# Patient Record
Sex: FEMALE | HISPANIC OR LATINO | Employment: FULL TIME | URBAN - METROPOLITAN AREA
[De-identification: names, ages, dates, MRNs, and addresses within clinical notes are randomized per-mention and may not be internally consistent; named-entity substitution may affect disease eponyms.]

---

## 2019-03-05 ENCOUNTER — APPOINTMENT (OUTPATIENT)
Dept: GENERAL RADIOLOGY | Facility: CLINIC | Age: 24
End: 2019-03-05
Attending: EMERGENCY MEDICINE
Payer: MEDICAID

## 2019-03-05 ENCOUNTER — HOSPITAL ENCOUNTER (EMERGENCY)
Facility: CLINIC | Age: 24
Discharge: HOME | End: 2019-03-05
Attending: EMERGENCY MEDICINE | Admitting: EMERGENCY MEDICINE
Payer: MEDICAID

## 2019-03-05 VITALS
OXYGEN SATURATION: 99 % | WEIGHT: 289 LBS | RESPIRATION RATE: 22 BRPM | SYSTOLIC BLOOD PRESSURE: 105 MMHG | DIASTOLIC BLOOD PRESSURE: 71 MMHG | HEART RATE: 79 BPM | TEMPERATURE: 98.1 F

## 2019-03-05 DIAGNOSIS — R07.9 CHEST PAIN, UNSPECIFIED TYPE: ICD-10-CM

## 2019-03-05 LAB
ANION GAP SERPL CALCULATED.3IONS-SCNC: 7 MMOL/L (ref 3–14)
BASOPHILS # BLD AUTO: 0 10E9/L (ref 0–0.2)
BASOPHILS NFR BLD AUTO: 0.3 %
BUN SERPL-MCNC: 12 MG/DL (ref 7–30)
CALCIUM SERPL-MCNC: 8.1 MG/DL (ref 8.5–10.1)
CHLORIDE SERPL-SCNC: 107 MMOL/L (ref 94–109)
CO2 SERPL-SCNC: 26 MMOL/L (ref 20–32)
CREAT SERPL-MCNC: 0.51 MG/DL (ref 0.52–1.04)
D DIMER PPP FEU-MCNC: 0.4 UG/ML FEU (ref 0–0.5)
DIFFERENTIAL METHOD BLD: ABNORMAL
EOSINOPHIL # BLD AUTO: 0.1 10E9/L (ref 0–0.7)
EOSINOPHIL NFR BLD AUTO: 0.9 %
ERYTHROCYTE [DISTWIDTH] IN BLOOD BY AUTOMATED COUNT: 15.3 % (ref 10–15)
GFR SERPL CREATININE-BSD FRML MDRD: >90 ML/MIN/{1.73_M2}
GLUCOSE SERPL-MCNC: 84 MG/DL (ref 70–99)
HCT VFR BLD AUTO: 37.3 % (ref 35–47)
HGB BLD-MCNC: 12.1 G/DL (ref 11.7–15.7)
IMM GRANULOCYTES # BLD: 0 10E9/L (ref 0–0.4)
IMM GRANULOCYTES NFR BLD: 0.3 %
INTERPRETATION ECG - MUSE: NORMAL
LYMPHOCYTES # BLD AUTO: 2.2 10E9/L (ref 0.8–5.3)
LYMPHOCYTES NFR BLD AUTO: 29.8 %
MCH RBC QN AUTO: 23.8 PG (ref 26.5–33)
MCHC RBC AUTO-ENTMCNC: 32.4 G/DL (ref 31.5–36.5)
MCV RBC AUTO: 73 FL (ref 78–100)
MONOCYTES # BLD AUTO: 0.4 10E9/L (ref 0–1.3)
MONOCYTES NFR BLD AUTO: 4.9 %
NEUTROPHILS # BLD AUTO: 4.8 10E9/L (ref 1.6–8.3)
NEUTROPHILS NFR BLD AUTO: 63.8 %
NRBC # BLD AUTO: 0 10*3/UL
NRBC BLD AUTO-RTO: 0 /100
PLATELET # BLD AUTO: 341 10E9/L (ref 150–450)
POTASSIUM SERPL-SCNC: 3.7 MMOL/L (ref 3.4–5.3)
RBC # BLD AUTO: 5.09 10E12/L (ref 3.8–5.2)
SODIUM SERPL-SCNC: 140 MMOL/L (ref 133–144)
TROPONIN I SERPL-MCNC: <0.015 UG/L (ref 0–0.04)
WBC # BLD AUTO: 7.5 10E9/L (ref 4–11)

## 2019-03-05 PROCEDURE — 85025 COMPLETE CBC W/AUTO DIFF WBC: CPT | Performed by: EMERGENCY MEDICINE

## 2019-03-05 PROCEDURE — 71046 X-RAY EXAM CHEST 2 VIEWS: CPT

## 2019-03-05 PROCEDURE — 84484 ASSAY OF TROPONIN QUANT: CPT | Performed by: EMERGENCY MEDICINE

## 2019-03-05 PROCEDURE — 80048 BASIC METABOLIC PNL TOTAL CA: CPT | Performed by: EMERGENCY MEDICINE

## 2019-03-05 PROCEDURE — 85379 FIBRIN DEGRADATION QUANT: CPT | Performed by: EMERGENCY MEDICINE

## 2019-03-05 PROCEDURE — 93005 ELECTROCARDIOGRAM TRACING: CPT

## 2019-03-05 PROCEDURE — 99285 EMERGENCY DEPT VISIT HI MDM: CPT | Mod: 25

## 2019-03-05 SDOH — HEALTH STABILITY: MENTAL HEALTH: HOW OFTEN DO YOU HAVE A DRINK CONTAINING ALCOHOL?: NEVER

## 2019-03-05 ASSESSMENT — ENCOUNTER SYMPTOMS
FEVER: 0
VOMITING: 0
NAUSEA: 0
BACK PAIN: 1
ABDOMINAL PAIN: 0
COUGH: 0

## 2019-03-05 NOTE — ED PROVIDER NOTES
"  History     Chief Complaint:  Chest pain     HPI   Milla Tinoco is a 24 year old female who presents with chest pain. The patient noticed chest pain 1 month ago in the anterior R sternal region that is sharp and intermittent. The episodes last about 10 minutes and nothing provokes or palliates the pain. Due to concern, the patient has visited the ED for evaluation and treatment. Upon arrival, the patient reports back pain in the middle and upper back that started 5 days ago. She describes the pain as \"burning.\" The patient denies any cough, fever, nausea, vomiting, or abdominal pain. The patient also denies any recent long travel, immobilization, use of birth control pills, or family history of heart issues.    Allergies:  The patient has no known drug allergies.    Medications:    The patient is currently on no known regular medications.     Past Medical History:    The patient has no known significant past medical history.    Past Surgical History:    The patient has no known pertinent past surgical history.    Family History:    No known past pertinent family history.    Social History:  Marital Status:  Unknown   Smoker:   Never   Alcohol:   No   Drugs:   No     Review of Systems   Constitutional: Negative for fever.   Respiratory: Negative for cough.    Cardiovascular: Positive for chest pain.   Gastrointestinal: Negative for abdominal pain, nausea and vomiting.   Musculoskeletal: Positive for back pain.   All other systems reviewed and are negative.    Physical Exam     Patient Vitals for the past 24 hrs:   BP Temp Temp src Pulse Heart Rate Resp SpO2 Weight   03/05/19 1353 -- -- -- -- 80 22 -- --   03/05/19 1200 105/71 -- -- -- -- -- -- --   03/05/19 1145 151/41 98.1  F (36.7  C) Oral 79 79 16 99 % 131.1 kg (289 lb)     Physical Exam  General/Appearance: appears stated age, well-groomed, appears comfortable, elevated BMI  Eyes: EOMI, no scleral injection, no icterus  ENT: MMM  Neck: supple, nl ROM, no " stiffness  Cardiovascular: RRR, nl S1S2, no m/r/g, 2+ pulses in all 4 extremities, cap refill <2sec  Respiratory: CTAB, good air movement throughout, no wheezes/rhonchi/rales, no increased WOB, no retractions  Back: no lesions  GI: abd soft, non-distended, nttp,  no HSM, no rebound, no guarding, nl BS  MSK: ALBERT, good tone, no bony abnormality  Skin: warm and well-perfused, no rash, no edema, no ecchymosis, nl turgor  Neuro: GCS 15, alert and oriented, no gross focal neuro deficits  Psych: interacts appropriately  Heme: no petechia, no purpura, no active bleeding    Emergency Department Course   ECG:  Indication: chest pain   Time: 1148  Vent. Rate 84 bpm. WI interval 158. QRS duration 80. QT/QTc 372/439. P-R-T axis 8 73 57. Normal sinus rhythm. Normal ECG. Read time: 1151    Imaging:  Radiographic findings were communicated with the patient who voiced understanding of the findings.    XR Chest 2 views:   Heart size is normal. No pleural effusion, pneumothorax,  or abnormal area of consolidation. as per radiology.     Laboratory:  CBC: WBC: 7.5, HGB: 12.1, PLT: 341  1212 Troponin: <0.015  D-Dimer: 0.4  BMP: Creatinine 0.51, calcium 8.1, o/w WNL    Emergency Department Course:  (1159) I performed an exam of the patient as documented above.     (5699) I rechecked the patient and discussed the results of their workup thus far.      Findings and plan explained. Patient discharged home with instructions regarding supportive care, medications, and reasons to return. The importance of close follow-up was reviewed.     I personally reviewed the laboratory results with them and answered all related questions prior to discharge.    Impression & Plan    Medical Decision Making:  This pt presents with chest pain of unclear etiology.  At this time I do not suspect that there is an acute/dangerous pathology for the chest pain.  They have no significant personal/family history of cardiac ds.  They have no significant cardiac risk  factors.  Their EKG and CXR were unremarkable.  Their trop was neg and I feel their risk is low-enough to not warrant delta trops.  I have also considered PE but they are PERC neg/have a neg DDimer. There are no focal infiltrates, effusions, pulm edema, or evidence of PTX on CXR. The pt has not had recent fevers or viral infections to suggest pericarditis.  They are at low risk for aortic pathology and have nl aortic contour on CXR.  They have not had recent chest trauma.  All of this was discussed with the pt and they were reassurred.  I have advised them to follow-up with their PCP in the next 3 days to get further evaluation, and to return to the ED sooner if their CP continues/worsens, they develop severe SOB/fevers/lightheadedness, or they develop any other new and concerning sxs. At this point the pt is HD stable and appropriate for discharge.    Diagnosis:    ICD-10-CM    1. Chest pain, unspecified type R07.9      Disposition:  discharged to home    Scribe Disclosure:  I, Noe Fuentes, am serving as a scribe on 3/5/2019 at 11:59 AM to personally document services performed by Jocelyn Kathleen* based on my observations and the provider's statements to me.     Noe Fuentes  3/5/2019    EMERGENCY DEPARTMENT       Jocelyn Kathleen MD  03/05/19 [id number]

## 2019-03-05 NOTE — ED AVS SNAPSHOT
Emergency Department  [address] [facility] [address]  Phone:  [phone]  Fax:  [phone]                                    Milla Tinoco   MRN: [id number]    Department:   Emergency Department   Date of Visit:  3/5/2019           After Visit Summary Signature Page    I have received my discharge instructions, and my questions have been answered. I have discussed any challenges I see with this plan with the nurse or doctor.    ..........................................................................................................................................  Patient/Patient Representative Signature      ..........................................................................................................................................  Patient Representative Print Name and Relationship to Patient    ..................................................               ................................................  Date                                   Time    ..........................................................................................................................................  Reviewed by Signature/Title    ...................................................              ..............................................  Date                                               Time          22EPIC Rev 08/18

## 2019-03-05 NOTE — LETTER
March 5, 2019      To Whom It May Concern:      Milla Tinoco was seen in our Emergency Department today, 03/05/19.  I expect her condition to improve over the next 1-2 days.  She may return to work/school.    Sincerely,

## 2019-05-13 ENCOUNTER — OFFICE VISIT (OUTPATIENT)
Dept: FAMILY MEDICINE | Facility: CLINIC | Age: 24
End: 2019-05-13
Payer: MEDICAID

## 2019-05-13 VITALS
BODY MASS INDEX: 55.32 KG/M2 | HEART RATE: 81 BPM | TEMPERATURE: 98.5 F | SYSTOLIC BLOOD PRESSURE: 127 MMHG | WEIGHT: 293 LBS | HEIGHT: 61 IN | OXYGEN SATURATION: 97 % | DIASTOLIC BLOOD PRESSURE: 79 MMHG

## 2019-05-13 DIAGNOSIS — N30.01 ACUTE CYSTITIS WITH HEMATURIA: Primary | ICD-10-CM

## 2019-05-13 DIAGNOSIS — R30.0 DYSURIA: ICD-10-CM

## 2019-05-13 PROBLEM — E66.01 MORBID OBESITY (H): Status: ACTIVE | Noted: 2019-05-13

## 2019-05-13 LAB
ALBUMIN UR-MCNC: NEGATIVE MG/DL
APPEARANCE UR: CLEAR
BACTERIA #/AREA URNS HPF: ABNORMAL /HPF
BILIRUB UR QL STRIP: NEGATIVE
COLOR UR AUTO: YELLOW
GLUCOSE UR STRIP-MCNC: NEGATIVE MG/DL
HGB UR QL STRIP: ABNORMAL
KETONES UR STRIP-MCNC: NEGATIVE MG/DL
LEUKOCYTE ESTERASE UR QL STRIP: NEGATIVE
NITRATE UR QL: NEGATIVE
NON-SQ EPI CELLS #/AREA URNS LPF: ABNORMAL /LPF
PH UR STRIP: 6 PH (ref 5–7)
RBC #/AREA URNS AUTO: ABNORMAL /HPF
SOURCE: ABNORMAL
SP GR UR STRIP: 1.02 (ref 1–1.03)
UROBILINOGEN UR STRIP-ACNC: 0.2 EU/DL (ref 0.2–1)
WBC #/AREA URNS AUTO: ABNORMAL /HPF

## 2019-05-13 PROCEDURE — 99203 OFFICE O/P NEW LOW 30 MIN: CPT | Performed by: PHYSICIAN ASSISTANT

## 2019-05-13 PROCEDURE — 81001 URINALYSIS AUTO W/SCOPE: CPT | Performed by: PHYSICIAN ASSISTANT

## 2019-05-13 RX ORDER — SULFAMETHOXAZOLE/TRIMETHOPRIM 800-160 MG
1 TABLET ORAL 2 TIMES DAILY
Qty: 14 TABLET | Refills: 0 | Status: SHIPPED | OUTPATIENT
Start: 2019-05-13 | End: 2019-11-06

## 2019-05-13 ASSESSMENT — MIFFLIN-ST. JEOR: SCORE: 2014.58

## 2019-05-13 NOTE — PROGRESS NOTES
"  SUBJECTIVE:   Milla Tinoco is a 24 year old female who presents to clinic today for the following   health issues:      URINARY TRACT SYMPTOMS      Duration: x3 days    Description  dysuria, frequency, urgency and back pain    Intensity:  mild    Accompanying signs and symptoms:  Fever/chills: no   Flank pain no   Nausea and vomiting: YES- vomited once   Vaginal symptoms: none  Abdominal/Pelvic Pain: YES    History  History of frequent UTI's: no   History of kidney stones: no   Sexually Active: YES  Possibility of pregnancy: No    Precipitating or alleviating factors: None    Therapies tried and outcome: Tylenol   Outcome: didn't help with sx           Additional history: as documented    Reviewed  and updated as needed this visit by clinical staff         Reviewed and updated as needed this visit by Provider         BP Readings from Last 3 Encounters:   05/13/19 127/79   03/05/19 105/71    Wt Readings from Last 3 Encounters:   05/13/19 133.7 kg (294 lb 12.8 oz)   03/05/19 131.1 kg (289 lb)                    ROS:  Constitutional, HEENT, cardiovascular, pulmonary, gi and gu systems are negative, except as otherwise noted.    OBJECTIVE:     /79   Pulse 81   Temp 98.5  F (36.9  C) (Oral)   Ht 1.549 m (5' 1\")   Wt 133.7 kg (294 lb 12.8 oz)   LMP 05/05/2019 (Approximate)   SpO2 97%   BMI 55.70 kg/m    Body mass index is 55.7 kg/m .  GENERAL: alert and no distress  EYES: Eyes grossly normal to inspection  PSYCH: mentation appears normal, affect normal/bright    Diagnostic Test Results:  Results for orders placed or performed in visit on 05/13/19 (from the past 24 hour(s))   UA with Microscopic reflex to Culture   Result Value Ref Range    Color Urine Yellow     Appearance Urine Clear     Glucose Urine Negative NEG^Negative mg/dL    Bilirubin Urine Negative NEG^Negative    Ketones Urine Negative NEG^Negative mg/dL    Specific Gravity Urine 1.025 1.003 - 1.035    pH Urine 6.0 5.0 - 7.0 pH    Protein Albumin " Urine Negative NEG^Negative mg/dL    Urobilinogen Urine 0.2 0.2 - 1.0 EU/dL    Nitrite Urine Negative NEG^Negative    Blood Urine Small (A) NEG^Negative    Leukocyte Esterase Urine Negative NEG^Negative    Source Midstream Urine     WBC Urine 0 - 5 OTO5^0 - 5 /HPF    RBC Urine O - 2 OTO2^O - 2 /HPF    Squamous Epithelial /LPF Urine Moderate (A) FEW^Few /LPF    Bacteria Urine Moderate (A) NEG^Negative /HPF       ASSESSMENT/PLAN:             1. Acute cystitis with hematuria  OTC probiotics while on antibiotic to help limit some potential side effects.   - sulfamethoxazole-trimethoprim (BACTRIM DS/SEPTRA DS) 800-160 MG tablet; Take 1 tablet by mouth 2 times daily  Dispense: 14 tablet; Refill: 0    2. Dysuria    - UA with Microscopic reflex to Culture    Follow up if symptoms persist or worsen     Aleksandar Dumas PA-C  [facility]

## 2019-05-13 NOTE — NURSING NOTE
"Chief Complaint   Patient presents with     Urinary Problem     /79   Pulse 81   Temp 98.5  F (36.9  C) (Oral)   Ht 1.549 m (5' 1\")   Wt 133.7 kg (294 lb 12.8 oz)   LMP 05/05/2019 (Approximate)   SpO2 97%   BMI 55.70 kg/m   Estimated body mass index is 55.7 kg/m  as calculated from the following:    Height as of this encounter: 1.549 m (5' 1\").    Weight as of this encounter: 133.7 kg (294 lb 12.8 oz).  Medication Reconciliation: complete      Health Maintenance that is potentially due pending provider review:  Pap Smear    Pt will schedule physical appt and have pap smear done at that time.    JOHANNY Jorge  "

## 2019-11-06 ENCOUNTER — HOSPITAL ENCOUNTER (EMERGENCY)
Facility: CLINIC | Age: 24
Discharge: HOME | End: 2019-11-06
Attending: PHYSICIAN ASSISTANT | Admitting: PHYSICIAN ASSISTANT
Payer: COMMERCIAL

## 2019-11-06 VITALS
TEMPERATURE: 98.1 F | BODY MASS INDEX: 55.32 KG/M2 | OXYGEN SATURATION: 100 % | DIASTOLIC BLOOD PRESSURE: 68 MMHG | HEART RATE: 91 BPM | WEIGHT: 293 LBS | RESPIRATION RATE: 18 BRPM | SYSTOLIC BLOOD PRESSURE: 128 MMHG | HEIGHT: 61 IN

## 2019-11-06 DIAGNOSIS — L03.011 PARONYCHIA OF FINGER OF RIGHT HAND: ICD-10-CM

## 2019-11-06 PROCEDURE — 99283 EMERGENCY DEPT VISIT LOW MDM: CPT | Mod: 25

## 2019-11-06 PROCEDURE — 11765 WEDGE EXCISION SKN NAIL FOLD: CPT

## 2019-11-06 RX ORDER — IBUPROFEN 200 MG
200 TABLET ORAL EVERY 4 HOURS PRN
COMMUNITY
End: 2022-08-11

## 2019-11-06 RX ORDER — ACETAMINOPHEN 325 MG/1
325-650 TABLET ORAL EVERY 6 HOURS PRN
COMMUNITY

## 2019-11-06 ASSESSMENT — MIFFLIN-ST. JEOR: SCORE: 2083.53

## 2019-11-06 ASSESSMENT — ENCOUNTER SYMPTOMS: FEVER: 0

## 2019-11-06 NOTE — ED AVS SNAPSHOT
Emergency Department  [address] [facility] [address]  Phone:  [phone]  Fax:  [phone]                                    Milla Tinoco   MRN: [id number]    Department:   Emergency Department   Date of Visit:  11/6/2019           After Visit Summary Signature Page    I have received my discharge instructions, and my questions have been answered. I have discussed any challenges I see with this plan with the nurse or doctor.    ..........................................................................................................................................  Patient/Patient Representative Signature      ..........................................................................................................................................  Patient Representative Print Name and Relationship to Patient    ..................................................               ................................................  Date                                   Time    ..........................................................................................................................................  Reviewed by Signature/Title    ...................................................              ..............................................  Date                                               Time          22EPIC Rev 08/18

## 2019-11-06 NOTE — ED PROVIDER NOTES
"  History     Chief Complaint:  Finger Pain    HPI   Milla Tinoco is a 24 year old female who presents to the emergency department today for evaluation of right third finger pain. The patient reports approximately five days of \"something growing\" around the nail of the right third finger with associated redness, pressure, swelling, and pain to the area. She denies any fever.     Allergies:  Mushroom      Medications:    Medications reviewed. No pertinent medications.    Past Medical History:    Morbid obesity     Past Surgical History:    GI surgery    Family History:    Family history reviewed. No pertinent family history.    Social History:  Smoking Status: Never Smoker  Smokeless Tobacco: Never Used  Alcohol Use: Negative  Drug Use: Negative  Marital Status:        Review of Systems   Constitutional: Negative for fever.   Musculoskeletal:        Redness, pressure, swelling, pain to right third finger   All other systems reviewed and are negative.    Physical Exam     Patient Vitals for the past 24 hrs:   BP Temp Temp src Pulse Resp SpO2 Height Weight   11/06/19 1610 128/68 98.1  F (36.7  C) Oral 91 18 100 % 1.549 m (5' 1\") 140.6 kg (310 lb)     Physical Exam  Constitutional: Alert, attentive,   CV:  2+ radial pulse, brisk distal cap refill  Pulm: No respiratory distress  MSK: Full ROM of right 3rd MCP and IP joints.   Neurological: Alert, attentive  5/5 strength to right 3rd MCP and IP joints with flexion/extension. Sensation intact to right 3rd finger.   Skin: Skin is warm and dry. Localized erythema with fluctuance to the ulnar aspect of the right 3rd fingernail. No streaking redness.   Psych: Normal mood and affect     Emergency Department Course     Procedures       Paronychia Drainage     PERFORMED BY: Theresa Matute PA-C  LOCATION: Right third digit  ANESTHESIA: None - patient declined digital block.  PROCEDURAL NOTE: Skin prepped with betadine.  11 Blade used to incise along the cuticle. Drainage " resulted.  Patient tolerated the procedure well without complications.     Emergency Department Course:    1616 Nursing notes and vitals reviewed.    1622 I performed an exam of the patient as documented above.     1625 I performed the paronychia drainage procedure as documented above.    1639 Wound dressed by ERT.    Findings and plan explained to the Patient. Patient discharged home with instructions regarding supportive care, medications, and reasons to return. The importance of close follow-up was reviewed.     Impression & Plan      Medical Decision Making:  Milla Tinoco is a 24 year old female who presents for evaluation of a painful right third finger.  This is consistent with a paronychia.  Incision and drainage yielded pus.  No signs marked cellulitis or other worrisome soft tissue/bony issue at this time.  Plan to follow up with primary for wound recheck as needed.  Would not start oral antibiotics at this time. Bacitracin and wound care discussed BID. Stable for discharge. Return precautions discussed including spreading redness, fevers, or increasing pain.     Diagnosis:    ICD-10-CM    1. Paronychia of finger of right hand L03.011     middle finger     Disposition:   The patient is discharged to home.    Scribe Disclosure:  IMae, am serving as a scribe at 4:18 PM on 11/6/2019 to document services personally performed by Theresa Matute PA-C based on my observations and the provider's statements to me.      EMERGENCY DEPARTMENT       Theresa Matute PA-C  11/06/19 [id number]

## 2019-11-06 NOTE — ED NOTES
Bed: FT02  Expected date:   Expected time:   Means of arrival:   Comments:  Triage finger injury

## 2019-11-06 NOTE — DISCHARGE INSTRUCTIONS
Follow home care instructions as detailed on the paperwork.   Return for spreading redness, fevers, or increasing pain.

## 2020-07-08 ENCOUNTER — OFFICE VISIT (OUTPATIENT)
Dept: FAMILY MEDICINE | Facility: CLINIC | Age: 25
End: 2020-07-08

## 2020-07-08 VITALS
SYSTOLIC BLOOD PRESSURE: 134 MMHG | HEIGHT: 61 IN | BODY MASS INDEX: 55.32 KG/M2 | OXYGEN SATURATION: 98 % | RESPIRATION RATE: 18 BRPM | HEART RATE: 111 BPM | TEMPERATURE: 99 F | WEIGHT: 293 LBS | DIASTOLIC BLOOD PRESSURE: 87 MMHG

## 2020-07-08 DIAGNOSIS — N89.8 VAGINAL ITCHING: Primary | ICD-10-CM

## 2020-07-08 DIAGNOSIS — E66.01 MORBID OBESITY (H): ICD-10-CM

## 2020-07-08 LAB
ALBUMIN UR-MCNC: NEGATIVE MG/DL
APPEARANCE UR: CLEAR
BACTERIA #/AREA URNS HPF: ABNORMAL /HPF
BILIRUB UR QL STRIP: NEGATIVE
COLOR UR AUTO: YELLOW
GLUCOSE UR STRIP-MCNC: NEGATIVE MG/DL
HGB UR QL STRIP: NEGATIVE
KETONES UR STRIP-MCNC: NEGATIVE MG/DL
LEUKOCYTE ESTERASE UR QL STRIP: ABNORMAL
NITRATE UR QL: NEGATIVE
NON-SQ EPI CELLS #/AREA URNS LPF: ABNORMAL /LPF
PH UR STRIP: 6 PH (ref 5–7)
RBC #/AREA URNS AUTO: ABNORMAL /HPF
SOURCE: ABNORMAL
SP GR UR STRIP: 1.02 (ref 1–1.03)
SPECIMEN SOURCE: ABNORMAL
UROBILINOGEN UR STRIP-ACNC: 0.2 EU/DL (ref 0.2–1)
WBC #/AREA URNS AUTO: ABNORMAL /HPF
WET PREP SPEC: ABNORMAL

## 2020-07-08 PROCEDURE — 87529 HSV DNA AMP PROBE: CPT | Performed by: NURSE PRACTITIONER

## 2020-07-08 PROCEDURE — 87210 SMEAR WET MOUNT SALINE/INK: CPT | Performed by: NURSE PRACTITIONER

## 2020-07-08 PROCEDURE — 99213 OFFICE O/P EST LOW 20 MIN: CPT | Performed by: NURSE PRACTITIONER

## 2020-07-08 PROCEDURE — 81001 URINALYSIS AUTO W/SCOPE: CPT | Performed by: NURSE PRACTITIONER

## 2020-07-08 PROCEDURE — 87491 CHLMYD TRACH DNA AMP PROBE: CPT | Performed by: NURSE PRACTITIONER

## 2020-07-08 PROCEDURE — 87591 N.GONORRHOEAE DNA AMP PROB: CPT | Performed by: NURSE PRACTITIONER

## 2020-07-08 RX ORDER — METRONIDAZOLE 500 MG/1
500 TABLET ORAL 2 TIMES DAILY
Qty: 14 TABLET | Refills: 0 | Status: SHIPPED | OUTPATIENT
Start: 2020-07-08 | End: 2022-08-11

## 2020-07-08 ASSESSMENT — MIFFLIN-ST. JEOR: SCORE: 2087.6

## 2020-07-08 NOTE — PROGRESS NOTES
"Subjective     Milla Tinoco is a 25 year old female who presents to clinic today for the following health issues:    HPI       Vaginal Symptoms  Onset: 3 days     Description:  Vaginal Discharge: none   Itching (Pruritis): YES in labial area   Burning sensation:  YES in labial area   Odor: no     Accompanying Signs & Symptoms:  Pain with Urination: YES- burning   Abdominal Pain: YES- pelvic pain   Fever: no     History:   Sexually active: YES  New Partner: no   Possibility of Pregnancy:  Don't Know    Precipitating factors:   Recent Antibiotic Use: no     Alleviating factors:      Therapies Tried and outcome: none       Patient Active Problem List   Diagnosis     Morbid obesity (H)     Past Surgical History:   Procedure Laterality Date     GI SURGERY         Social History     Tobacco Use     Smoking status: Never Smoker     Smokeless tobacco: Never Used   Substance Use Topics     Alcohol use: No     Frequency: Never     History reviewed. No pertinent family history.      Current Outpatient Medications   Medication Sig Dispense Refill     metroNIDAZOLE (FLAGYL) 500 MG tablet Take 1 tablet (500 mg) by mouth 2 times daily 14 tablet 0     acetaminophen (TYLENOL) 325 MG tablet Take 325-650 mg by mouth every 6 hours as needed for mild pain       ibuprofen (ADVIL/MOTRIN) 200 MG tablet Take 200 mg by mouth every 4 hours as needed for mild pain       Allergies   Allergen Reactions     Mushroom        Reviewed and updated as needed this visit by Provider  Milla presents today with a 3 day h/o vaginal itching and irritation and mild lower abdominal pain.   She notes that her  has noticed new 'red dots' on his penis ( 5 days) no penile discharge.  Denies urinary symptoms , back pain    Review of Systems   Constitutional, HEENT, cardiovascular, pulmonary, gi and gu systems are negative, except as otherwise noted.      Objective    /87   Pulse 111   Temp 99  F (37.2  C) (Oral)   Resp 18   Ht 1.549 m (5' 1\")   Wt " 141.5 kg (312 lb)   LMP 04/26/2020   SpO2 98%   BMI 58.95 kg/m    Body mass index is 58.95 kg/m .  Physical Exam   GENERAL: healthy, alert and no distress  NECK: no adenopathy, no asymmetry, masses, or scars and thyroid normal to palpation  RESP: lungs clear to auscultation - no rales, rhonchi or wheezes  CV: regular rate and rhythm, normal S1 S2, no S3 or S4, no murmur, click or rub, no peripheral edema and peripheral pulses strong  ABDOMEN: soft, nontender, no hepatosplenomegaly, no masses and bowel sounds normal   (female): pelvic exam:  normal female external genitalia, no lesions seen  normal urethral meatus, vaginal mucosa, normal cervix/adnexa/uterus without masses.  , obese patient   There is small amount of yellowish mucousy discharge in vagina  MS: no gross musculoskeletal defects noted, no edema    Diagnostic Test Results:  Labs reviewed in Epic  Results for orders placed or performed in visit on 07/08/20   UA with Microscopic reflex to Culture     Status: Abnormal    Specimen: Midstream Urine   Result Value Ref Range    Color Urine Yellow     Appearance Urine Clear     Glucose Urine Negative NEG^Negative mg/dL    Bilirubin Urine Negative NEG^Negative    Ketones Urine Negative NEG^Negative mg/dL    Specific Gravity Urine 1.025 1.003 - 1.035    pH Urine 6.0 5.0 - 7.0 pH    Protein Albumin Urine Negative NEG^Negative mg/dL    Urobilinogen Urine 0.2 0.2 - 1.0 EU/dL    Nitrite Urine Negative NEG^Negative    Blood Urine Negative NEG^Negative    Leukocyte Esterase Urine Trace (A) NEG^Negative    Source Midstream Urine     WBC Urine 0 - 5 OTO5^0 - 5 /HPF    RBC Urine O - 2 OTO2^O - 2 /HPF    Squamous Epithelial /LPF Urine Moderate (A) FEW^Few /LPF    Bacteria Urine Few (A) NEG^Negative /HPF   Wet prep     Status: Abnormal    Specimen: Vagina   Result Value Ref Range    Specimen Description Vagina     Wet Prep No Trichomonas seen     Wet Prep Few  Clue cells seen   (A)     Wet Prep No yeast seen     Wet Prep  Moderate  WBC'S seen              Assessment & Plan   Problem List Items Addressed This Visit     Morbid obesity (H)      Other Visit Diagnoses     Vaginal itching    -  Primary    Relevant Medications    metroNIDAZOLE (FLAGYL) 500 MG tablet    Other Relevant Orders    Chlamydia trachomatis PCR (Completed)    Neisseria gonorrhoeae PCR (Completed)    Wet prep (Completed)    UA with Microscopic reflex to Culture (Completed)    HSV 1 and 2 DNA by PCR (Completed)             Follow up with results     Work on weight loss  Regular exercise    YOEL Cuenca [facility]

## 2020-07-08 NOTE — PATIENT INSTRUCTIONS
Patient Education     Testing for Suspected STI  Your symptoms suggest that you may have a sexually transmitted infection (STI). The most common bacteria that cause STIs are chlamydia and gonorrhea. Both are highly contagious. They are passed by sexual contact with an infected partner.  Symptoms start within 1 to 3 weeks after exposure. There is usually a discharge from the penis or vagina and burning during urination. Many women with one of these infections will have only mild symptoms or no symptoms at all early in the disease.  Tests have been done to show if you have an infection with chlamydia or gonorrhea. These infections can be treated and cured with antibiotic medicine.  Home care  Don't have sex until you know that your test result is negative.  Call for the results of your tests. If the test is positive, contact your healthcare provider, local clinic, or local public health department to be treated, or return to our facility.    You will be prescribed antibiotic medicine. Be sure to take all of the antibiotic as prescribed until it is gone or you are told to stop. Keep taking it even if you feel better.    Both you and your sexual partner or partners need to be treated, even if the partner has no symptoms.    Don't have sex until both you and your partner or partners have finished all antibiotic medicine and you are told that you are no longer contagious.  Learn about safe sex practices and use these in the future. The safest sex is with a partner who has tested negative for STIs and only has sex with you. Condoms can help prevent the spread of gonorrhea and chlamydia, but are not a guarantee.  Follow-up care  Follow up with your healthcare provider, or as advised. Call as directed for the results of your test. This is to be sure the infection has cleared. Follow up with your provider or the public health department for complete STI screening, including HIV testing, and to consider ways to prevent HIV.  "For more information about STIs, call the CDC information line at 634-770-2536 or look at the [facility] website online.  When to seek medical advice  Call your healthcare provider if any of these occur:    Fever of 100.4 F (38.0 C) or higher, or as directed    New pain in your lower belly (abdomen) or back or pain that gets worse    Unexpected vaginal bleeding    Weakness, dizziness, or fainting    Repeated vomiting    Inability to urinate because of pain    Rash or joint pain    Painful open sores on the penis, or in or around the outer vagina or rectum    Enlarged painful lumps (lymph nodes) in the groin    Testicle pain or scrotal swelling in men  Date Last Reviewed: 4/1/2018 2000-2019 [employer]. [address] [place] [address], [place] [id number]. All rights reserved. This information is not intended as a substitute for professional medical care. Always follow your healthcare professional's instructions.         Patient Education     Bladder Infection, Female (Adult)    Urine is normally doesn't have any bacteria in it. But bacteria can get into the urinary tract from the skin around the rectum. Or they can travel in the blood from elsewhere in the body. Once they are in your urinary tract, they can cause infection in the urethra (urethritis), the bladder (cystitis), or the kidneys (pyelonephritis).  The most common place for an infection is in the bladder. This is called a bladder infection. This is one of the most common infections in women. Most bladder infections are easily treated. They are not serious unless the infection spreads to the kidney.  The phrases \"bladder infection,\" \"UTI,\" and \"cystitis\" are often used to describe the same thing. But they are not always the same. Cystitis is an inflammation of the bladder. The most common cause of cystitis is an infection.  Symptoms  The infection causes inflammation in the urethra and bladder. This causes many of the symptoms. The most common symptoms of a " bladder infection are:    Pain or burning when urinating    Having to urinate more often than usual    Urgent need to urinate    Only a small amount of urine comes out    Blood in urine    Abdominal discomfort. This is usually in the lower abdomen above the pubic bone.    Cloudy urine    Strong- or bad-smelling urine    Unable to urinate (urinary retention)    Unable to hold urine in (urinary incontinence)    Fever    Loss of appetite    Confusion (in older adults)  Causes  Bladder infections are not contagious. You can't get one from someone else, from a toilet seat, or from sharing a bath.  The most common cause of bladder infections is bacteria from the bowels. The bacteria get onto the skin around the opening of the urethra. From there, they can get into the urine and travel up to the bladder, causing inflammation and infection. This usually happens because of:    Wiping improperly after urinating. Always wipe from front to back.    Bowel incontinence    Pregnancy    Procedures such as having a catheter inserted    Older age    Not emptying your bladder. This can allow bacteria a chance to grow in your urine.    Dehydration    Constipation    Sex    Use of a diaphragm for birth control   Treatment  Bladder infections are diagnosed by a urine test. They are treated with antibiotics and usually clear up quickly without complications. Treatment helps prevent a more serious kidney infection.  Medicines  Medicines can help in the treatment of a bladder infection:    Take antibiotics until they are used up, even if you feel better. It is important to finish them to make sure the infection has cleared.    You can use acetaminophen or ibuprofen for pain, fever, or discomfort, unless another medicine was prescribed. If you have chronic liver or kidney disease, talk with your healthcare provider before using these medicines. Also talk with your provider if you've ever had a stomach ulcer or gastrointestinal bleeding, or  are taking blood-thinner medicines.    If you are given phenazopydridine to reduce burning with urination, it will cause your urine to become a bright orange color. This can stain clothing.  Care and prevention  These self-care steps can help prevent future infections:    Drink plenty of fluids to prevent dehydration and flush out your bladder. Do this unless you must restrict fluids for other health reasons, or your doctor told you not to.    Proper cleaning after going to the bathroom is important. Wipe from front to back after using the toilet to prevent the spread of bacteria.    Urinate more often. Don't try to hold urine in for a long time.    Wear loose-fitting clothes and cotton underwear. Avoid tight-fitting pants.    Improve your diet and prevent constipation. Eat more fresh fruit and vegetables, and fiber, and less junk and fatty foods.    Avoid sex until your symptoms are gone.    Avoid caffeine, alcohol, and spicy foods. These can irritate your bladder.    Urinate right after intercourse to flush out your bladder.    If you use birth control pills and have frequent bladder infections, discuss it with your doctor.  Follow-up care  Call your healthcare provider if all symptoms are not gone after 3 days of treatment. This is especially important if you have repeat infections.  If a culture was done, you will be told if your treatment needs to be changed. If directed, you can call to find out the results.  If X-rays were done, you will be told if the results will affect your treatment.  Call 911  Call 911 if any of the following occur:    Trouble breathing    Hard to wake up or confusion    Fainting or loss of consciousness    Rapid heart rate  When to seek medical advice  Call your healthcare provider right away if any of these occur:    Fever of 100.4 F (38.0 C) or higher, or as directed by your healthcare provider    Symptoms are not better by the third day of treatment    Back or belly (abdominal) pain  that gets worse    Repeated vomiting, or unable to keep medicine down    Weakness or dizziness    Vaginal discharge    Pain, redness, or swelling in the outer vaginal area (labia)  Date Last Reviewed: 10/1/2016    3477-4287 [employer]. [address] [place] [address], [place] [address]. All rights reserved. This information is not intended as a substitute for professional medical care. Always follow your healthcare professional's instructions.

## 2020-07-10 ENCOUNTER — TELEPHONE (OUTPATIENT)
Dept: FAMILY MEDICINE | Facility: CLINIC | Age: 25
End: 2020-07-10

## 2020-07-10 DIAGNOSIS — N76.0 BACTERIAL VAGINOSIS: Primary | ICD-10-CM

## 2020-07-10 DIAGNOSIS — B96.89 BACTERIAL VAGINOSIS: Primary | ICD-10-CM

## 2020-07-10 LAB
C TRACH DNA SPEC QL NAA+PROBE: NEGATIVE
HSV1 DNA SPEC QL NAA+PROBE: NEGATIVE
HSV2 DNA SPEC QL NAA+PROBE: NEGATIVE
N GONORRHOEA DNA SPEC QL NAA+PROBE: NEGATIVE
SPECIMEN SOURCE: NORMAL

## 2020-07-10 RX ORDER — METRONIDAZOLE 7.5 MG/G
1 GEL VAGINAL DAILY
Qty: 25 G | Refills: 0 | Status: SHIPPED | OUTPATIENT
Start: 2020-07-10 | End: 2020-07-15

## 2020-07-10 NOTE — TELEPHONE ENCOUNTER
I called patient to discuss results.  She is doing well. No dysuria/fever/back pain.  She did not tolerate the Flagyl and did not take a dose due to vomiting just with it on her tongue.  I will switch it to vaginal gel.    Also I requested that patient do another pregnancy urine test at home .  Her last one was 2 weeks ago. No menses since April 2020. She is typically irregular.   She will follow up as needed.  Mayi Astudillo (Lori) CNP

## 2020-07-10 NOTE — TELEPHONE ENCOUNTER
I attempted to contact patient to review results and how she is doing. There was no answer or voicemail available.   Mayi Astudillo (Lori) CNP

## 2020-09-02 NOTE — ED NOTES
Advance Care Planning  People with COVID-19 may have no symptoms, mild symptoms, such as fever, cough, and shortness of breath or they may have more severe illness, developing severe and fatal pneumonia. As a result, Advance Care Planning with attention to naming a health care decision maker (someone you trust to make healthcare decisions for you if you could not speak for yourself) and sharing other health care preferences is important BEFORE a possible health crisis. Please contact your Primary Care Provider to discuss Advance Care Planning. Preventing the Spread of Coronavirus Disease 2019 in Homes and Residential Communities  For the most recent information go to [email or web address]    Prevention steps for People with confirmed or suspected COVID-19 (including persons under investigation) who do not need to be hospitalized  and   People with confirmed COVID-19 who were hospitalized and determined to be medically stable to go home    Your healthcare provider and public health staff will evaluate whether you can be cared for at home. If it is determined that you do not need to be hospitalized and can be isolated at home, you will be monitored by staff from your local or state health department. You should follow the prevention steps below until a healthcare provider or local or state health department says you can return to your normal activities. Stay home except to get medical care  People who are mildly ill with COVID-19 are able to isolate at home during their illness. You should restrict activities outside your home, except for getting medical care. Do not go to work, school, or public areas. Avoid using public transportation, ride-sharing, or taxis. Separate yourself from other people and animals in your home  People: As much as possible, you should stay in a specific room and away from other people in your home.  Also, you should use a separate Right middle finger nail I&D done with puss drained. Patient reports pain has improving since.    bathroom, if available. Animals: You should restrict contact with pets and other animals while you are sick with COVID-19, just like you would around other people. Although there have not been reports of pets or other animals becoming sick with COVID-19, it is still recommended that people sick with COVID-19 limit contact with animals until more information is known about the virus. When possible, have another member of your household care for your animals while you are sick. If you are sick with COVID-19, avoid contact with your pet, including petting, snuggling, being kissed or licked, and sharing food. If you must care for your pet or be around animals while you are sick, wash your hands before and after you interact with pets and wear a facemask. Call ahead before visiting your doctor  If you have a medical appointment, call the healthcare provider and tell them that you have or may have COVID-19. This will help the healthcare providers office take steps to keep other people from getting infected or exposed. Wear a facemask  You should wear a facemask when you are around other people (e.g., sharing a room or vehicle) or pets and before you enter a healthcare providers office. If you are not able to wear a facemask (for example, because it causes trouble breathing), then people who live with you should not stay in the same room with you, or they should wear a facemask if they enter your room. Cover your coughs and sneezes  Cover your mouth and nose with a tissue when you cough or sneeze. Throw used tissues in a lined trash can. Immediately wash your hands with soap and water for at least 20 seconds or, if soap and water are not available, clean your hands with an alcohol-based hand  that contains at least 60% alcohol.   Clean your hands often  Wash your hands often with soap and water for at least 20 seconds, especially after blowing your nose, coughing, or sneezing; going to the bathroom; and have a medical emergency and need to call 911, notify the dispatch personnel that you have, or are being evaluated for COVID-19. If possible, put on a facemask before emergency medical services arrive. Discontinuing home isolation  Patients with confirmed COVID-19 should remain under home isolation precautions until the risk of secondary transmission to others is thought to be low. The decision to discontinue home isolation precautions should be made on a case-by-case basis, in consultation with healthcare providers and state and local health departments.

## 2020-10-27 ENCOUNTER — OFFICE VISIT (OUTPATIENT)
Dept: FAMILY MEDICINE | Facility: CLINIC | Age: 25
End: 2020-10-27

## 2020-10-27 VITALS
OXYGEN SATURATION: 100 % | HEART RATE: 93 BPM | TEMPERATURE: 97.9 F | DIASTOLIC BLOOD PRESSURE: 84 MMHG | SYSTOLIC BLOOD PRESSURE: 130 MMHG | WEIGHT: 293 LBS | BODY MASS INDEX: 61.03 KG/M2

## 2020-10-27 DIAGNOSIS — R53.83 FATIGUE, UNSPECIFIED TYPE: ICD-10-CM

## 2020-10-27 DIAGNOSIS — I89.0 LYMPHEDEMA: Primary | ICD-10-CM

## 2020-10-27 DIAGNOSIS — R60.0 EDEMA OF BOTH LEGS: ICD-10-CM

## 2020-10-27 DIAGNOSIS — E66.01 MORBID OBESITY (H): ICD-10-CM

## 2020-10-27 LAB
BASOPHILS # BLD AUTO: 0 10E9/L (ref 0–0.2)
BASOPHILS NFR BLD AUTO: 0.3 %
DIFFERENTIAL METHOD BLD: ABNORMAL
EOSINOPHIL # BLD AUTO: 0.3 10E9/L (ref 0–0.7)
EOSINOPHIL NFR BLD AUTO: 3.1 %
ERYTHROCYTE [DISTWIDTH] IN BLOOD BY AUTOMATED COUNT: 15.5 % (ref 10–15)
HCT VFR BLD AUTO: 38.8 % (ref 35–47)
HGB BLD-MCNC: 12.2 G/DL (ref 11.7–15.7)
LYMPHOCYTES # BLD AUTO: 2.8 10E9/L (ref 0.8–5.3)
LYMPHOCYTES NFR BLD AUTO: 26.4 %
MCH RBC QN AUTO: 23.2 PG (ref 26.5–33)
MCHC RBC AUTO-ENTMCNC: 31.4 G/DL (ref 31.5–36.5)
MCV RBC AUTO: 74 FL (ref 78–100)
MONOCYTES # BLD AUTO: 0.7 10E9/L (ref 0–1.3)
MONOCYTES NFR BLD AUTO: 6.2 %
NEUTROPHILS # BLD AUTO: 6.7 10E9/L (ref 1.6–8.3)
NEUTROPHILS NFR BLD AUTO: 64 %
PLATELET # BLD AUTO: 394 10E9/L (ref 150–450)
RBC # BLD AUTO: 5.26 10E12/L (ref 3.8–5.2)
WBC # BLD AUTO: 10.5 10E9/L (ref 4–11)

## 2020-10-27 PROCEDURE — 99214 OFFICE O/P EST MOD 30 MIN: CPT | Performed by: INTERNAL MEDICINE

## 2020-10-27 PROCEDURE — 36415 COLL VENOUS BLD VENIPUNCTURE: CPT | Performed by: INTERNAL MEDICINE

## 2020-10-27 PROCEDURE — 80050 GENERAL HEALTH PANEL: CPT | Performed by: INTERNAL MEDICINE

## 2020-10-27 NOTE — PROGRESS NOTES
Subjective     Milla Tinoco is a 25 year old female who presents to clinic today for the following health issues:    HPI         Musculoskeletal problem/pain  Onset/Duration: 3 weeks  Description  Location: ball Of feet and calf - left  Joint Swelling: YES, both  Redness: YES?  Pain: YES  Warmth: no  Intensity:  moderate  Progression of Symptoms:  worsening  Accompanying signs and symptoms:   Fevers: no  Numbness/tingling/weakness: YES  History  Trauma to the area: no  Recent illness:  no  Previous similar problem: no  Previous evaluation:  no  Precipitating or alleviating factors:  Aggravating factors include: standing  Therapies tried and outcome: massage, acetaminophen and Ibuprofen    Seeing patient 1st time, Last seen by CUONG [place] in 5/2019 .   Last labs in 3/2019 with low calcium ; CBC with normal HBG but low MCV.   Leg cramps - Check for electrolytes, HBG levels before considering further treatment or medications.       Allergies   Allergen Reactions     Mushroom         History reviewed. No pertinent past medical history.    Past Surgical History:   Procedure Laterality Date     GI SURGERY         History reviewed. No pertinent family history.    Social History     Tobacco Use     Smoking status: Never Smoker     Smokeless tobacco: Never Used   Substance Use Topics     Alcohol use: No     Frequency: Never        Current Outpatient Medications   Medication     acetaminophen (TYLENOL) 325 MG tablet     ibuprofen (ADVIL/MOTRIN) 200 MG tablet     metroNIDAZOLE (FLAGYL) 500 MG tablet     No current facility-administered medications for this visit.         Review of Systems   Constitutional, HEENT, cardiovascular, pulmonary, GI, , musculoskeletal, neuro, skin, endocrine and psych systems are negative, except as otherwise noted.      Objective    /84 (BP Location: Other (Comment), Cuff Size: Adult Regular)   Pulse 93   Temp 97.9  F (36.6  C) (Tympanic)   Wt 146.5 kg (323 lb)   SpO2 100%   BMI 61.03  kg/m    Body mass index is 61.03 kg/m .  Physical Exam   GENERAL: healthy, alert and no distress  NECK: no adenopathy, no asymmetry, masses, or scars and thyroid normal to palpation  RESP: lungs clear to auscultation - no rales, rhonchi or wheezes  CV: regular rate and rhythm, normal S1 S2, no S3 or S4, no murmur, click or rub, and peripheral pulses strong  ABDOMEN: soft, nontender, no hepatosplenomegaly, no masses and bowel sounds normal  MS: POSITIVE for bilateral lower extremity severe Lymphedema, Nonpitting type, Negative for Homans sign.    Labs and imaging reviewed and discussed with the patient.        Assessment and Plan  1. Lymphedema  - LYMPHEDEMA THERAPY REFERRAL; Future  - Compression Sleeve/Stocking Order for DME - ONLY FOR DME    2. Edema of both legs  Possible Varicose veins will be checked which might be causing her Stasis changes in lower extremities. Clinical exam limited due to morbid Obesity for Varicose veins to be visible.   - US Lower Extremity Venous Duplex Bilateral; Future  - Compression Sleeve/Stocking Order for DME - ONLY FOR DME    3. Morbid obesity (H)  4. Fatigue, unspecified type  Will make sure no metabolic causes before further recommendations.  - CBC with platelets differential  - Comprehensive metabolic panel  - TSH with free T4 reflex     Patient Instructions   Please do the lab work given.     DME for compression stockings sent to your pharmacy.     Referral for Lymphedema therapy placed.     ===================    Patient Education     Lymphedema  The lymphatic system is made up of lymph vessels and lymph nodes, which carry a fluid called lymph. Lymph consists of waste from the cells. This fluid drains through lymph vessels under the skin to nearby lymph nodes. Lymph nodes filter waste products from the cells. They kill any bacteria present before returning the lymph fluid to your blood circulation.  When the lymph vessels are damaged, lymph fluid can't drain from tissues. This  causes the lymph fluid to back up. This leads to swelling. This most often affects the arms or legs. Signs of lymphedema include heaviness, stiffness, or aching in an arm or leg. The limb may swell. The skin might look red. Shoes and rings may feel tight. Ankles and wrists might become less flexible.  The most common cause of damage to the lymph system is surgery or radiation for breast or testicular cancer. Other causes include repeated skin infections (cellulitis), burns, or injury to the arms or legs. It can take many years for symptoms of lymphedema to appear. Once present, lymphedema can become an ongoing (chronic) condition. This means the problem can be managed but not cured.   Treatment often includes using compression garments, getting massage, and doing special exercises. Talk with your healthcare provider about these treatments and the best treatment plan for you. Ask your healthcare provider about a referral to a certified lymphedema therapist. This is a provider who specializes in lymphedema teaching and management.   Home care  You can help keep the condition from getting worse. Follow all instructions you have been given. Do your exercises and wear your compression garments as recommended. Also, care for yourself as advised by your healthcare provider.     Be careful with your skin. Small skin injuries like a cut, burn, or insect bite are more likely to cause a skin infection. Take special care to not get injured. If you have any signs of infection, call your healthcare provider right away.    Take care of your skin and nails. Use a moisturizer on dry skin. Wear protective gloves when doing chores such as gardening.     Don't wear tight clothing or jewelry on the affected arm or leg. Don't carry bags or other weight with the affected arm.    Shave with an electric razor instead of a razor blade.    If at all possible, don t have blood pressure taken, get shots, or have blood drawn in the affected  arm.    If a leg is involved, don t cross your legs when sitting. Don't go barefoot.    Don't use hot tubs, steam rooms, or saunas.  If you are at risk for lymphedema but have not developed it, these tips can help also help prevent it. Follow your healthcare provider's instructions.  Follow-up care  Follow up with your healthcare provider, or as advised.  Lymphedema can change the appearance of your body. This can be emotionally difficult to adjust to. You may benefit from a support group where practical advice and emotional support is offered. Also consider getting one-on-one counseling.  When to seek medical advice  Call your healthcare provider right away if any of these occur:    Swelling worsens    Rash, blistering, or other skin changes on the affected limb    Area of skin becomes red, painful, or warm to the touch    A wound increases in pain, becomes warm, drains pus, or sends out red streaks    Fever of 100.4 F (38 C) or higher, or as directed by your healthcare provider  Date Last Reviewed: 10/1/2016    3357-2239 [employer]. [address] [place] [address]. All rights reserved. This information is not intended as a substitute for professional medical care. Always follow your healthcare professional's instructions.    ==========    Patient Education     Linfedema  El sistema linfático está formado por los vasos linfáticos y los ganglios linfáticos, que transportan un líquido llamado linfa. La linfa consiste en líquidos desechados por las células. [place] líquido drena a través de los vasos de la linfa bajo la piel a los ganglios linfáticos cercanos. Los ganglios linfáticos filtran los productos de desecho de las células y chanda cualquier bacteria presente antes de devolver la linfa a la circulación sanguínea.     Cuando los vasos linfáticos se lesionan, no pueden cumplir larios tarea de drenar el líquido de los tejidos del cuerpo. [place] hace que la linfa fluya hacia atrás y la parte del  cuerpo afectada, más comúnmente los brazos o las piernas, se hinche. Esta hinchazón se llama linfedema. Los signos de linfedema incluyen pesadez, rigidez o dolor en el brazo o la pierna afectados. También es probable que rosamaria extremidad se hinche. La piel puede verse enrojecida. Los zapatos y los anillos pueden quedarle apretados. Los tobillos y las muñecas pueden perder flexibilidad.     La causa más frecuente de daño al sistema de la linfa es la cirugía o radiación para el cáncer de seno o de testículo. Otras causas son las infecciones cutáneas repetidas (celulitis), quemaduras o lesiones de los brazos o las piernas. Los síntomas de linfedema pueden tardar muchos años en aparecer. Lila vez presente, el linfedema puede hacerse crónico y durar toda la jurgen de la persona. Eso significa que el problema puede manejarse, nitin no tiene irving.     El tratamiento suele incluir el uso de ropa de compresión, masajes y ejercicios especiales. Hable con larios proveedor de atención médica acerca de estas terapias y del plan de tratamiento que mejor se adapte a larios zaki.  Cuidados en larios casa  Puede ayudar a evitar que la afección empeore. Siga todas las instrucciones que le hayan dado. Ramesh los ejercicios que le hayan indicado y use la ropa de compresión izzy sylvester le recomendaron. Además, cuide de usted según le explicaron.    Las lesiones pequeñas en la piel, sylvester lila cortadura, lila quemadura o lila picadura de insecto tienen más probabilidades de producir lila infección en la piel. Esté atento para evitar estas lesiones. Si nota algún signo de infección, llame a larios proveedor de atención médica de inmediato.    Cuide larios piel y vicenta uñas. Humecte la piel seca. Use guantes de protección cuando ramesh tareas tales sylvester trabajar en el jardín.    No use ropa ajustada ni alhajas en el brazo o la pierna afectados. Evite llevar carteras u otro tipo de peso sobre el brazo afectado.    Use lila rasuradora eléctrica en lugar de lila hoja de  afeitar.    De ser posible, no use el brazo afectado para medirse la presión arterial, aplicarse inyecciones ni para extracción de rishi.    Si está afectada nick de vicenta piernas, no cruce las piernas al sentarse. No justin descalzo.    Evite los jacuzzis y los saunas.  Si tiene riesgo de desarrollar linfedema nitin aún no lo hizo, estas sugerencias pueden ayudarle también sylvester prevención. Siga las instrucciones de larios proveedor de atención médica.  Visitas de control  Programe nick visita de control con larios proveedor de atención médica o según lo indicado por nuestro personal.     El linfedema puede cambiar la apariencia de larios cuerpo. Puede resultarle difícil adaptarse a eso. Un clovis de apoyo podría serle de ayuda, porque allí se ofrecen consejos prácticos y contención emocional. La consejería individual es otra opción.  Cuándo debe buscar atención médica  Llame a larios proveedor de atención médica si se presenta cualquiera de las siguientes situaciones:    La inflamación empeora    Salpullido, ampollas u otros cambios en la piel en la extremidad afectada    La marcellus de la piel afectada se enrojece, presenta dolor o calor al tacto    Aumenta el dolor o el calor en nick herida, supura pus o tiene líneas castillo que salen de lesley hacia afuera    Fiebre de 100.4  F (38  C) o más, o según le indique larios proveedor de atención médica  Date Last Reviewed: 10/1/2016    [id number] [employer]. [address] [place] [address]. Todos los derechos reservados. Esta información no pretende sustituir la atención médica profesional. Sólo larios médico puede diagnosticar y tratar un problema de zehra.                  Return in about 4 weeks (around 11/24/2020), or if symptoms worsen or fail to improve, for Preventative Visit.    Lorna Yanez MD  [facility]

## 2020-10-27 NOTE — PATIENT INSTRUCTIONS
Please do the lab work given.     DME for compression stockings sent to your pharmacy.     Referral for Lymphedema therapy placed.     ===================    Patient Education     Lymphedema  The lymphatic system is made up of lymph vessels and lymph nodes, which carry a fluid called lymph. Lymph consists of waste from the cells. This fluid drains through lymph vessels under the skin to nearby lymph nodes. Lymph nodes filter waste products from the cells. They kill any bacteria present before returning the lymph fluid to your blood circulation.  When the lymph vessels are damaged, lymph fluid can't drain from tissues. This causes the lymph fluid to back up. This leads to swelling. This most often affects the arms or legs. Signs of lymphedema include heaviness, stiffness, or aching in an arm or leg. The limb may swell. The skin might look red. Shoes and rings may feel tight. Ankles and wrists might become less flexible.  The most common cause of damage to the lymph system is surgery or radiation for breast or testicular cancer. Other causes include repeated skin infections (cellulitis), burns, or injury to the arms or legs. It can take many years for symptoms of lymphedema to appear. Once present, lymphedema can become an ongoing (chronic) condition. This means the problem can be managed but not cured.   Treatment often includes using compression garments, getting massage, and doing special exercises. Talk with your healthcare provider about these treatments and the best treatment plan for you. Ask your healthcare provider about a referral to a certified lymphedema therapist. This is a provider who specializes in lymphedema teaching and management.   Home care  You can help keep the condition from getting worse. Follow all instructions you have been given. Do your exercises and wear your compression garments as recommended. Also, care for yourself as advised by your healthcare provider.     Be careful with your skin.  Small skin injuries like a cut, burn, or insect bite are more likely to cause a skin infection. Take special care to not get injured. If you have any signs of infection, call your healthcare provider right away.    Take care of your skin and nails. Use a moisturizer on dry skin. Wear protective gloves when doing chores such as gardening.     Don't wear tight clothing or jewelry on the affected arm or leg. Don't carry bags or other weight with the affected arm.    Shave with an electric razor instead of a razor blade.    If at all possible, don t have blood pressure taken, get shots, or have blood drawn in the affected arm.    If a leg is involved, don t cross your legs when sitting. Don't go barefoot.    Don't use hot tubs, steam rooms, or saunas.  If you are at risk for lymphedema but have not developed it, these tips can help also help prevent it. Follow your healthcare provider's instructions.  Follow-up care  Follow up with your healthcare provider, or as advised.  Lymphedema can change the appearance of your body. This can be emotionally difficult to adjust to. You may benefit from a support group where practical advice and emotional support is offered. Also consider getting one-on-one counseling.  When to seek medical advice  Call your healthcare provider right away if any of these occur:    Swelling worsens    Rash, blistering, or other skin changes on the affected limb    Area of skin becomes red, painful, or warm to the touch    A wound increases in pain, becomes warm, drains pus, or sends out red streaks    Fever of 100.4 F (38 C) or higher, or as directed by your healthcare provider  Date Last Reviewed: 10/1/2016    1376-1052 [employer]. [address] [place] [address]. All rights reserved. This information is not intended as a substitute for professional medical care. Always follow your healthcare professional's instructions.    ==========    Patient Education     Volodymyr Early  sistema linfático está formado por los vasos linfáticos y los ganglios linfáticos, que transportan un líquido llamado linfa. La linfa consiste en líquidos desechados por las células. [place] líquido drena a través de los vasos de la linfa bajo la piel a los ganglios linfáticos cercanos. Los ganglios linfáticos filtran los productos de desecho de las células y chanda cualquier bacteria presente antes de devolver la linfa a la circulación sanguínea.     Cuando los vasos linfáticos se lesionan, no pueden cumplir larios tarea de drenar el líquido de los tejidos del cuerpo. [place] hace que la linfa fluya hacia atrás y la parte del cuerpo afectada, más comúnmente los brazos o las piernas, se hinche. Esta hinchazón se llama linfedema. Los signos de linfedema incluyen pesadez, rigidez o dolor en el brazo o la pierna afectados. También es probable que rosamaria extremidad se hinche. La piel puede verse enrojecida. Los zapatos y los anillos pueden quedarle apretados. Los tobillos y las muñecas pueden perder flexibilidad.     La causa más frecuente de daño al sistema de la linfa es la cirugía o radiación para el cáncer de seno o de testículo. Otras causas son las infecciones cutáneas repetidas (celulitis), quemaduras o lesiones de los brazos o las piernas. Los síntomas de linfedema pueden tardar muchos años en aparecer. Lila vez presente, el linfedema puede hacerse crónico y durar toda la jurgen de la persona. Eso significa que el problema puede manejarse, nitin no tiene irving.     El tratamiento suele incluir el uso de ropa de compresión, masajes y ejercicios especiales. Hable con larios proveedor de atención médica acerca de estas terapias y del plan de tratamiento que mejor se adapte a larios zaki.  Cuidados en larios casa  Puede ayudar a evitar que la afección empeore. Siga todas las instrucciones que le hayan dado. Michaela los ejercicios que le hayan indicado y use la ropa de compresión izzy sylvester le recomendaron. Además, cuide de usted según le  explicaron.    Las lesiones pequeñas en la piel, sylvester nick cortadura, nick quemadura o nick picadura de insecto tienen más probabilidades de producir nick infección en la piel. Esté atento para evitar estas lesiones. Si nota algún signo de infección, llame a larios proveedor de atención médica de inmediato.    Cuide larios piel y vicenta uñas. Humecte la piel seca. Use guantes de protección cuando ramesh tareas tales sylvester trabajar en el jardín.    No use ropa ajustada ni alhajas en el brazo o la pierna afectados. Evite llevar carteras u otro tipo de peso sobre el brazo afectado.    Use nick rasuradora eléctrica en lugar de nick hoja de afeitar.    De ser posible, no use el brazo afectado para medirse la presión arterial, aplicarse inyecciones ni para extracción de rishi.    Si está afectada nick de vicenta piernas, no cruce las piernas al sentarse. No justin descalzo.    Evite los jacuzzis y los saunas.  Si tiene riesgo de desarrollar linfedema nitin aún no lo hizo, estas sugerencias pueden ayudarle también sylvester prevención. Siga las instrucciones de larios proveedor de atención médica.  Visitas de control  Programe nick visita de control con larios proveedor de atención médica o según lo indicado por nuestro personal.     El linfedema puede cambiar la apariencia de larios cuerpo. Puede resultarle difícil adaptarse a eso. Un clovis de apoyo podría serle de ayuda, porque allí se ofrecen consejos prácticos y contención emocional. La consejería individual es otra opción.  Cuándo debe buscar atención médica  Llame a larios proveedor de atención médica si se presenta cualquiera de las siguientes situaciones:    La inflamación empeora    Salpullido, ampollas u otros cambios en la piel en la extremidad afectada    La marcellus de la piel afectada se enrojece, presenta dolor o calor al tacto    Aumenta el dolor o el calor en nick herida, supura pus o tiene líneas castillo que salen de lesley hacia afuera    Fiebre de 100.4  F (38  C) o más, o según le indique larios proveedor de  atención médica  Date Last Reviewed: 10/1/2016    [id number] [employer]. [address] [place] [address], [place] [address]. Todos los derechos reservados. Esta información no pretende sustituir la atención médica profesional. Sólo larios médico puede diagnosticar y tratar un problema de zehra.

## 2020-10-27 NOTE — LETTER
October 29, 2020      Milla Tinoco  [address]  [facility] [id number]        Dear ,    We are writing to inform you of your test results.    Your blood work is positive for low cell size for which I have placed additional lab work.   All your other labs are normal, there might be some highlighted which doesn't have any clinical significance.     Resulted Orders   CBC with platelets differential   Result Value Ref Range    WBC 10.5 4.0 - 11.0 10e9/L    RBC Count 5.26 (H) 3.8 - 5.2 10e12/L    Hemoglobin 12.2 11.7 - 15.7 g/dL    Hematocrit 38.8 35.0 - 47.0 %    MCV 74 (L) 78 - 100 fl    MCH 23.2 (L) 26.5 - 33.0 pg    MCHC 31.4 (L) 31.5 - 36.5 g/dL    RDW 15.5 (H) 10.0 - 15.0 %    Platelet Count 394 150 - 450 10e9/L    Diff Method Automated Method     % Neutrophils 64.0 %    % Lymphocytes 26.4 %    % Monocytes 6.2 %    % Eosinophils 3.1 %    % Basophils 0.3 %    Absolute Neutrophil 6.7 1.6 - 8.3 10e9/L    Absolute Lymphocytes 2.8 0.8 - 5.3 10e9/L    Absolute Monocytes 0.7 0.0 - 1.3 10e9/L    Absolute Eosinophils 0.3 0.0 - 0.7 10e9/L    Absolute Basophils 0.0 0.0 - 0.2 10e9/L   Comprehensive metabolic panel   Result Value Ref Range    Sodium 137 133 - 144 mmol/L    Potassium 4.0 3.4 - 5.3 mmol/L    Chloride 104 94 - 109 mmol/L    Carbon Dioxide 29 20 - 32 mmol/L    Anion Gap 4 3 - 14 mmol/L    Glucose 83 70 - 99 mg/dL    Urea Nitrogen 11 7 - 30 mg/dL    Creatinine 0.57 0.52 - 1.04 mg/dL    GFR Estimate >90 >60 mL/min/[1.73_m2]      Comment:      Non  GFR Calc  Starting 12/18/2018, serum creatinine based estimated GFR (eGFR) will be   calculated using the Chronic Kidney Disease Epidemiology Collaboration   (CKD-EPI) equation.      GFR Estimate If Black >90 >60 mL/min/[1.73_m2]      Comment:       GFR Calc  Starting 12/18/2018, serum creatinine based estimated GFR (eGFR) will be   calculated using the Chronic Kidney Disease Epidemiology Collaboration   (CKD-EPI)  equation.      Calcium 9.1 8.5 - 10.1 mg/dL    Bilirubin Total 0.3 0.2 - 1.3 mg/dL    Albumin 3.8 3.4 - 5.0 g/dL    Protein Total 7.9 6.8 - 8.8 g/dL    Alkaline Phosphatase 141 40 - 150 U/L    ALT 32 0 - 50 U/L    AST 24 0 - 45 U/L   TSH with free T4 reflex   Result Value Ref Range    TSH 2.40 0.40 - 4.00 mU/L       If you have any questions or concerns, please call the clinic at the number listed above.       Sincerely,        Lorna Yanez MD

## 2020-10-28 DIAGNOSIS — R71.8 LOW MEAN CORPUSCULAR VOLUME (MCV): Primary | ICD-10-CM

## 2020-10-28 LAB
ALBUMIN SERPL-MCNC: 3.8 G/DL (ref 3.4–5)
ALP SERPL-CCNC: 141 U/L (ref 40–150)
ALT SERPL W P-5'-P-CCNC: 32 U/L (ref 0–50)
ANION GAP SERPL CALCULATED.3IONS-SCNC: 4 MMOL/L (ref 3–14)
AST SERPL W P-5'-P-CCNC: 24 U/L (ref 0–45)
BILIRUB SERPL-MCNC: 0.3 MG/DL (ref 0.2–1.3)
BUN SERPL-MCNC: 11 MG/DL (ref 7–30)
CALCIUM SERPL-MCNC: 9.1 MG/DL (ref 8.5–10.1)
CHLORIDE SERPL-SCNC: 104 MMOL/L (ref 94–109)
CO2 SERPL-SCNC: 29 MMOL/L (ref 20–32)
CREAT SERPL-MCNC: 0.57 MG/DL (ref 0.52–1.04)
GFR SERPL CREATININE-BSD FRML MDRD: >90 ML/MIN/{1.73_M2}
GLUCOSE SERPL-MCNC: 83 MG/DL (ref 70–99)
POTASSIUM SERPL-SCNC: 4 MMOL/L (ref 3.4–5.3)
PROT SERPL-MCNC: 7.9 G/DL (ref 6.8–8.8)
SODIUM SERPL-SCNC: 137 MMOL/L (ref 133–144)
TSH SERPL DL<=0.005 MIU/L-ACNC: 2.4 MU/L (ref 0.4–4)

## 2021-02-01 ENCOUNTER — TELEPHONE (OUTPATIENT)
Dept: FAMILY MEDICINE | Facility: CLINIC | Age: 26
End: 2021-02-01

## 2021-02-01 NOTE — LETTER
February 1, 2021    Milla Tinoco  [address]  [facility] [address]    Dear Honorio Loyola cares about your health and your health plan.  I have reviewed your medical conditions, medication list and lab results, and am making recommendations based on this review to better manage your health.    You are in particular need of attention regarding:  -Cervical Cancer Screening  -Wellness (Physical) Visit     I am recommending that you:     -schedule a WELLNESS (Physical) APPOINTMENT with me.   I will check fasting labs the same day - nothing to eat except water and meds for 8-10 hours prior.    -schedule a PAP SMEAR EXAM which is due.  Please disregard this reminder if you have had this exam elsewhere within the last year.  It would be helpful for us to have a copy of your recent pap smear report in our file so that we can best coordinate your care.    If you are under/uninsured, we recommend you contact the [facility]. They offer pap smears at no charge or on a sliding fee charge. You can schedule with them at [phone]. Please have them send us the results.      Please call us at the [facility] or use [employer] to address the above recommendations.     Thank you for trusting [facility].  We appreciate the opportunity to serve you and look forward to supporting your healthcare in the future.    If you have (or plan to have) any of these tests done at a facility other than a [facility] or a [facility], please have the results sent to the [facility] location noted above.      Best Regards,    Lorna Yanez MD

## 2021-02-01 NOTE — TELEPHONE ENCOUNTER
Needs of attention regarding:  -Cervical Cancer Screening  -Wellness (Physical) Visit     Health Maintenance Topics with due status: Overdue       Topic Date Due    PREVENTIVE CARE VISIT 1993    HIV SCREENING 01/16/2008    HEPATITIS C SCREENING 01/16/2011    PAP 01/16/2014    INFLUENZA VACCINE 09/01/2020    PHQ-2 01/01/2021       Communication:  See Letter

## 2021-03-16 ENCOUNTER — OFFICE VISIT (OUTPATIENT)
Dept: FAMILY MEDICINE | Facility: CLINIC | Age: 26
End: 2021-03-16
Payer: OTHER GOVERNMENT

## 2021-03-16 VITALS
WEIGHT: 293 LBS | RESPIRATION RATE: 14 BRPM | HEART RATE: 80 BPM | OXYGEN SATURATION: 100 % | BODY MASS INDEX: 55.32 KG/M2 | TEMPERATURE: 98.3 F | DIASTOLIC BLOOD PRESSURE: 80 MMHG | HEIGHT: 61 IN | SYSTOLIC BLOOD PRESSURE: 122 MMHG

## 2021-03-16 DIAGNOSIS — R07.0 THROAT PAIN: Primary | ICD-10-CM

## 2021-03-16 DIAGNOSIS — R05.9 COUGH: ICD-10-CM

## 2021-03-16 LAB
DEPRECATED S PYO AG THROAT QL EIA: NEGATIVE
SPECIMEN SOURCE: NORMAL

## 2021-03-16 PROCEDURE — 99N1174 PR STATISTIC STREP A RAPID: Performed by: FAMILY MEDICINE

## 2021-03-16 PROCEDURE — U0005 INFEC AGEN DETEC AMPLI PROBE: HCPCS | Performed by: FAMILY MEDICINE

## 2021-03-16 PROCEDURE — 99203 OFFICE O/P NEW LOW 30 MIN: CPT | Performed by: FAMILY MEDICINE

## 2021-03-16 PROCEDURE — U0003 INFECTIOUS AGENT DETECTION BY NUCLEIC ACID (DNA OR RNA); SEVERE ACUTE RESPIRATORY SYNDROME CORONAVIRUS 2 (SARS-COV-2) (CORONAVIRUS DISEASE [COVID-19]), AMPLIFIED PROBE TECHNIQUE, MAKING USE OF HIGH THROUGHPUT TECHNOLOGIES AS DESCRIBED BY CMS-2020-01-R: HCPCS | Performed by: FAMILY MEDICINE

## 2021-03-16 PROCEDURE — 87651 STREP A DNA AMP PROBE: CPT | Performed by: FAMILY MEDICINE

## 2021-03-16 ASSESSMENT — MIFFLIN-ST. JEOR: SCORE: 2118.89

## 2021-03-16 NOTE — PROGRESS NOTES
"    Assessment & Plan       ICD-10-CM    1. Throat pain  R07.0 Streptococcus A Rapid Scr w Reflx to PCR     Group A Streptococcus PCR Throat Swab     Symptomatic COVID-19 Virus (Coronavirus) by PCR   2. Cough  R05 Symptomatic COVID-19 Virus (Coronavirus) by PCR      Cough/throat pain- Sx's for ~10 days, tested negative for COVID last Thursday.  Sx's improving the last couple days.  Strep negative, unlikely sinus or pneumonia infection based on s/sx's.  Pt wanted a return to work letter, but with ST and cough still present, will want to test again to r/o COVID prior to return to work.   COVID test pending - if negative, okay to return to work given improving sx's and time since symptoms started.      22 minutes spent on the date of the encounter doing chart review, review of outside records, review of test results, interpretation of tests, patient visit and documentation        BMI:   Estimated body mass index is 60.46 kg/m  as calculated from the following:    Height as of this encounter: 1.549 m (5' 1\").    Weight as of this encounter: 145.2 kg (320 lb).   Weight management plan: Discussed healthy diet and exercise guidelines      Return in about 1 month (around 4/16/2021) for Physical Exam.    Aleja Barnes MD  [facility] is a 26 year old who presents for the following health issues- cough, throat pain    HPI     Acute Illness  Acute illness concerns: sore throat and cough  Onset/Duration: 2 weeks  Symptoms:  Fever: no  Chills/Sweats: YES  Headache (location?): no  Sinus Pressure: no  Conjunctivitis:  YES  Ear Pain: no  Rhinorrhea: YES  Congestion: YES  Sore Throat: YES  Cough: YES  Wheeze: YES  Decreased Appetite: no  Nausea: YES  Vomiting: no  Diarrhea: YES  Dysuria/Freq.: no  Dysuria or Hematuria: no  Fatigue/Achiness: YES  Sick/Strep Exposure: no  Therapies tried and outcome: None    ---Wakes up in am, hard to breathing, wheezing sound.  Throat area " "hurts, burning sensation.  Muffled voice.  ---Cough has been going on for ~1 1/2 weeks.  ---Eyes are really watery.  Red for a couple days over last weekend- redness resolved, but water sx's did not.     has similar sx's.  Son had a fever - this past Sat.    She was tested for COVID - last Wed, negative.  She was off last week for - too sick, too tired.  Feels a bit better yesterday and today.  Tried to go back to work yesterday, but they sent her home due to the cough.    Screening testing at work- last Thur- negative.  [employer] in [place].  No known sick contacts other than her family.    Brother has asthma.  She hasn't tried an albuterol inhaler.        Review of Systems   Constitutional, HEENT, cardiovascular, pulmonary, gi and gu systems are negative, except as otherwise noted.      Objective    /80   Pulse 80   Temp 98.3  F (36.8  C) (Oral)   Resp 14   Ht 1.549 m (5' 1\")   Wt 145.2 kg (320 lb)   LMP 02/02/2021   SpO2 100%   Breastfeeding No   BMI 60.46 kg/m    Body mass index is 60.46 kg/m .  Physical Exam   GENERAL APPEARANCE: pleasant, alert and no distress     EYES: PERRL, sclera clear     HENT: nares clear, oropharynx without erythema, swelling or exudate, sinuses non-tender to palpation, TM's normal with good light reflex     NECK: no adenopathy, no asymmetry, masses, or scars and thyroid normal to palpation     RESP: lungs clear to auscultation - no rales, rhonchi or wheezes     CV: regular rates and rhythm, normal S1 S2, no S3 or S4 and no murmur, click or rub      Ext: warm, dry, no edema        Office Visit on 10/27/2020   Component Date Value Ref Range Status     WBC 10/27/2020 10.5  4.0 - 11.0 10e9/L Final     RBC Count 10/27/2020 5.26* 3.8 - 5.2 10e12/L Final     Hemoglobin 10/27/2020 12.2  11.7 - 15.7 g/dL Final     Hematocrit 10/27/2020 38.8  35.0 - 47.0 % Final     MCV 10/27/2020 74* 78 - 100 fl Final     MCH 10/27/2020 23.2* 26.5 - 33.0 pg Final     MCHC 10/27/2020 " 31.4* 31.5 - 36.5 g/dL Final     RDW 10/27/2020 15.5* 10.0 - 15.0 % Final     Platelet Count 10/27/2020 394  150 - 450 10e9/L Final     Diff Method 10/27/2020 Automated Method   Final     % Neutrophils 10/27/2020 64.0  % Final     % Lymphocytes 10/27/2020 26.4  % Final     % Monocytes 10/27/2020 6.2  % Final     % Eosinophils 10/27/2020 3.1  % Final     % Basophils 10/27/2020 0.3  % Final     Absolute Neutrophil 10/27/2020 6.7  1.6 - 8.3 10e9/L Final     Absolute Lymphocytes 10/27/2020 2.8  0.8 - 5.3 10e9/L Final     Absolute Monocytes 10/27/2020 0.7  0.0 - 1.3 10e9/L Final     Absolute Eosinophils 10/27/2020 0.3  0.0 - 0.7 10e9/L Final     Absolute Basophils 10/27/2020 0.0  0.0 - 0.2 10e9/L Final     Sodium 10/27/2020 137  133 - 144 mmol/L Final     Potassium 10/27/2020 4.0  3.4 - 5.3 mmol/L Final     Chloride 10/27/2020 104  94 - 109 mmol/L Final     Carbon Dioxide 10/27/2020 29  20 - 32 mmol/L Final     Anion Gap 10/27/2020 4  3 - 14 mmol/L Final     Glucose 10/27/2020 83  70 - 99 mg/dL Final     Urea Nitrogen 10/27/2020 11  7 - 30 mg/dL Final     Creatinine 10/27/2020 0.57  0.52 - 1.04 mg/dL Final     GFR Estimate 10/27/2020 >90  >60 mL/min/[1.73_m2] Final    Comment: Non  GFR Calc  Starting 12/18/2018, serum creatinine based estimated GFR (eGFR) will be   calculated using the Chronic Kidney Disease Epidemiology Collaboration   (CKD-EPI) equation.       GFR Estimate If Black 10/27/2020 >90  >60 mL/min/[1.73_m2] Final    Comment:  GFR Calc  Starting 12/18/2018, serum creatinine based estimated GFR (eGFR) will be   calculated using the Chronic Kidney Disease Epidemiology Collaboration   (CKD-EPI) equation.       Calcium 10/27/2020 9.1  8.5 - 10.1 mg/dL Final     Bilirubin Total 10/27/2020 0.3  0.2 - 1.3 mg/dL Final     Albumin 10/27/2020 3.8  3.4 - 5.0 g/dL Final     Protein Total 10/27/2020 7.9  6.8 - 8.8 g/dL Final     Alkaline Phosphatase 10/27/2020 141  40 - 150 U/L Final      ALT 10/27/2020 32  0 - 50 U/L Final     AST 10/27/2020 24  0 - 45 U/L Final     TSH 10/27/2020 2.40  0.40 - 4.00 mU/L Final     Results for orders placed or performed in visit on 03/16/21   Symptomatic COVID-19 Virus (Coronavirus) by PCR     Status: None    Specimen: Nasopharyngeal   Result Value Ref Range    COVID-19 Virus PCR to [facility] - Source Nasopharyngeal     COVID-19 Virus PCR to [facility] - Result       Test received-See reflex to IDDL test SARS CoV2 (COVID-19) Virus RT-PCR   SARS-CoV-2 COVID-19 Virus (Coronavirus) by PCR     Status: None    Specimen: Nasopharyngeal   Result Value Ref Range    SARS-CoV-2 Virus Specimen Source Nasopharyngeal     SARS-CoV-2 PCR Result NEGATIVE     SARS-CoV-2 PCR Comment       Testing was performed using the Aptima SARS-CoV-2 Assay on the [employer] Instrument System.   Additional information about this Emergency Use Authorization (EUA) assay can be found via   the Lab Guide.     Streptococcus A Rapid Scr w Reflx to PCR     Status: None    Specimen: Throat   Result Value Ref Range    Strep Specimen Description Throat     Streptococcus Group A Rapid Screen Negative NEG^Negative   Group A Streptococcus PCR Throat Swab     Status: None    Specimen: Throat   Result Value Ref Range    Specimen Description Throat     Strep Group A PCR Not Detected NDET^Not Detected

## 2021-03-17 LAB
LABORATORY COMMENT REPORT: NORMAL
SARS-COV-2 RNA RESP QL NAA+PROBE: NEGATIVE
SARS-COV-2 RNA RESP QL NAA+PROBE: NORMAL
SPECIMEN SOURCE: NORMAL
STREP GROUP A PCR: NOT DETECTED

## 2021-04-25 ENCOUNTER — HEALTH MAINTENANCE LETTER (OUTPATIENT)
Age: 26
End: 2021-04-25

## 2021-10-10 ENCOUNTER — HEALTH MAINTENANCE LETTER (OUTPATIENT)
Age: 26
End: 2021-10-10

## 2022-02-09 ENCOUNTER — OFFICE VISIT (OUTPATIENT)
Dept: URGENT CARE | Facility: URGENT CARE | Age: 27
End: 2022-02-09
Payer: MEDICAID

## 2022-02-09 VITALS
TEMPERATURE: 98.2 F | HEART RATE: 74 BPM | BODY MASS INDEX: 55.32 KG/M2 | RESPIRATION RATE: 16 BRPM | DIASTOLIC BLOOD PRESSURE: 75 MMHG | HEIGHT: 61 IN | WEIGHT: 293 LBS | SYSTOLIC BLOOD PRESSURE: 113 MMHG | OXYGEN SATURATION: 97 %

## 2022-02-09 DIAGNOSIS — H10.9 BACTERIAL CONJUNCTIVITIS OF LEFT EYE: ICD-10-CM

## 2022-02-09 DIAGNOSIS — L03.213 PRESEPTAL CELLULITIS OF LEFT EYE: Primary | ICD-10-CM

## 2022-02-09 PROCEDURE — 99213 OFFICE O/P EST LOW 20 MIN: CPT | Performed by: NURSE PRACTITIONER

## 2022-02-09 RX ORDER — POLYMYXIN B SULFATE AND TRIMETHOPRIM 1; 10000 MG/ML; [USP'U]/ML
1-2 SOLUTION OPHTHALMIC EVERY 4 HOURS
Qty: 5 ML | Refills: 0 | Status: SHIPPED | OUTPATIENT
Start: 2022-02-09 | End: 2022-02-16

## 2022-02-09 ASSESSMENT — MIFFLIN-ST. JEOR: SCORE: 2136.57

## 2022-02-09 NOTE — LETTER
[facility]  [address] [place] [address]  Phone: [phone]        2022    Milla Tinoco  [address]  [facility] [address] [phone] (home)     :     1995      To Whom it May Concern:    This patient was seen in clinic today for acute eye condition. Please excuse medical related absences. May return to work Tuesday 02/15.    Please contact me for questions or concerns.    Sincerely,    Izzy Salinas NP

## 2022-02-09 NOTE — PROGRESS NOTES
"Chief Complaint   Patient presents with     Urgent Care     Eye Problem     L eye pain, redness, eye lid swelling x2 days.     SUBJECTIVE:  Milla Tinoco is a 27 year old female who presents with left eye infection for 2 days. It started with crust, goup, discomfort and now the upper lid is swollen, red, tender. She does relay slight blur. No deep eyeball pain, fever or ciliary flush. Has been using systane drops with some relief. Denies contacts, recent sinus infection.    No past medical history on file.  acetaminophen (TYLENOL) 325 MG tablet, Take 325-650 mg by mouth every 6 hours as needed for mild pain  ibuprofen (ADVIL/MOTRIN) 200 MG tablet, Take 200 mg by mouth every 4 hours as needed for mild pain (Patient not taking: Reported on 2/9/2022)  metroNIDAZOLE (FLAGYL) 500 MG tablet, Take 1 tablet (500 mg) by mouth 2 times daily (Patient not taking: Reported on 10/27/2020)    No current facility-administered medications on file prior to visit.    Social History     Tobacco Use     Smoking status: Never Smoker     Smokeless tobacco: Never Used   Substance Use Topics     Alcohol use: No     Allergies   Allergen Reactions     Mushroom      Review of Systems   All systems negative except for those listed above in HPI.    OBJECTIVE:  /75   Pulse 74   Temp 98.2  F (36.8  C) (Temporal)   Resp 16   Ht 1.549 m (5' 1\")   Wt 147.4 kg (325 lb)   LMP 02/07/2022   SpO2 97%   BMI 61.41 kg/m      Physical Exam  Vitals reviewed.   Constitutional:       Appearance: Normal appearance.   HENT:      Head: Normocephalic and atraumatic.   Eyes:      General:         Left eye: Discharge present.     Extraocular Movements: Extraocular movements intact.      Pupils: Pupils are equal, round, and reactive to light.      Comments: Left upper lid with moderate erythema, edema, tenderness, tiny flesh toned stye at lashline, small amount of green goup and crust. Conjunctiva is normal without ciliary flush.   Cardiovascular:      " Rate and Rhythm: Normal rate.   Pulmonary:      Effort: Pulmonary effort is normal.   Musculoskeletal:         General: Normal range of motion.   Skin:     General: Skin is warm and dry.      Findings: Rash present.   Neurological:      General: No focal deficit present.      Mental Status: She is alert and oriented to person, place, and time.   Psychiatric:         Mood and Affect: Mood normal.         Behavior: Behavior normal.       ASSESSMENT:    ICD-10-CM    1. Preseptal cellulitis of left eye  L03.213 amoxicillin-clavulanate (AUGMENTIN) 875-125 MG tablet     Adult Eye Referral   2. Bacterial conjunctivitis of left eye  H10.9 trimethoprim-polymyxin b (POLYTRIM) 30600-1.1 UNIT/ML-% ophthalmic solution     Adult Eye Referral     PLAN:    Polytrim and Augmentin for bacterial conjunctivitis as well as preseptal cellulitis  Warm wet rag, change each day as well as pillow case for 1 week  Contagious for 24 hours  ER or same day optho if no improvement in 48 hours, fever, severe eye pain, vision loss, ciliary flush  No need for ophtho follow-up if improving    Follow up with primary care provider with any problems, questions or concerns or if symptoms worsen or fail to improve. Patient agreed to plan and verbalized understanding.    Izzy Salinas, [facility]

## 2022-02-11 ENCOUNTER — TELEPHONE (OUTPATIENT)
Dept: OPHTHALMOLOGY | Facility: CLINIC | Age: 27
End: 2022-02-11

## 2022-02-11 NOTE — TELEPHONE ENCOUNTER
Spoke to pt at 1320    Pt states swelling improved, but still having some irritation/scratchiness in eye. Some symptoms of scratchiness in opposite eye now.    Scheduled exam with Dr. Felix on Monday at [facility] location    [facility] has [facility] clinic number and [phone] option 4 number to page on call eye provider over weekend for any worsening symptoms/vision changes    Wilfrido Wellington RN 1:27 PM 02/11/22    --      [phone] home/cell    Called and left message with direct number    -- per PCP note ok to hold on ophthalmology if improving (ER or same day optho if no improvement in 48 hours, fever, severe eye pain, vision loss, ciliary flush  No need for ophtho follow-up if improving)    Able to review/schedule at call back.    Wilfrido Wellington RN 11:19 AM 02/11/22           Health Call Center    Phone Message    May a detailed message be left on voicemail: no     Reason for Call: Appointment Intake    Referring Provider Name: DAILY BRYANT  Diagnosis and/or Symptoms: Preseptal cellulitis of left eye  Bacterial conjunctivitis of left eye    Additional Information:Eye Pain      Sending to clinic for review  Action Taken: Other: Eye    Travel Screening: Not Applicable

## 2022-05-21 ENCOUNTER — HEALTH MAINTENANCE LETTER (OUTPATIENT)
Age: 27
End: 2022-05-21

## 2022-08-11 ENCOUNTER — OFFICE VISIT (OUTPATIENT)
Dept: FAMILY MEDICINE | Facility: CLINIC | Age: 27
End: 2022-08-11
Payer: COMMERCIAL

## 2022-08-11 VITALS
BODY MASS INDEX: 57.52 KG/M2 | DIASTOLIC BLOOD PRESSURE: 73 MMHG | TEMPERATURE: 97.8 F | HEART RATE: 78 BPM | HEIGHT: 60 IN | WEIGHT: 293 LBS | OXYGEN SATURATION: 99 % | SYSTOLIC BLOOD PRESSURE: 112 MMHG

## 2022-08-11 DIAGNOSIS — Z12.4 ENCOUNTER FOR SCREENING FOR CERVICAL CANCER: ICD-10-CM

## 2022-08-11 DIAGNOSIS — E66.01 MORBID OBESITY (H): ICD-10-CM

## 2022-08-11 DIAGNOSIS — Z13.0 SCREENING, ANEMIA, DEFICIENCY, IRON: ICD-10-CM

## 2022-08-11 DIAGNOSIS — Z00.00 ROUTINE GENERAL MEDICAL EXAMINATION AT A HEALTH CARE FACILITY: Primary | ICD-10-CM

## 2022-08-11 DIAGNOSIS — Z11.59 ENCOUNTER FOR HEPATITIS C SCREENING TEST FOR LOW RISK PATIENT: ICD-10-CM

## 2022-08-11 DIAGNOSIS — Z11.4 SCREENING FOR HIV WITHOUT PRESENCE OF RISK FACTORS: ICD-10-CM

## 2022-08-11 DIAGNOSIS — Z13.1 SCREENING FOR DIABETES MELLITUS: ICD-10-CM

## 2022-08-11 DIAGNOSIS — M25.512 ACUTE PAIN OF LEFT SHOULDER: ICD-10-CM

## 2022-08-11 DIAGNOSIS — Z13.21 ENCOUNTER FOR VITAMIN DEFICIENCY SCREENING: ICD-10-CM

## 2022-08-11 DIAGNOSIS — R60.0 EDEMA OF BOTH LEGS: ICD-10-CM

## 2022-08-11 LAB
ALBUMIN SERPL-MCNC: 3.5 G/DL (ref 3.4–5)
ALP SERPL-CCNC: 141 U/L (ref 40–150)
ALT SERPL W P-5'-P-CCNC: 34 U/L (ref 0–50)
ANION GAP SERPL CALCULATED.3IONS-SCNC: 10 MMOL/L (ref 3–14)
AST SERPL W P-5'-P-CCNC: 28 U/L (ref 0–45)
BASOPHILS # BLD AUTO: 0 10E3/UL (ref 0–0.2)
BASOPHILS NFR BLD AUTO: 0 %
BILIRUB SERPL-MCNC: 0.2 MG/DL (ref 0.2–1.3)
BUN SERPL-MCNC: 12 MG/DL (ref 7–30)
CALCIUM SERPL-MCNC: 8.6 MG/DL (ref 8.5–10.1)
CHLORIDE BLD-SCNC: 105 MMOL/L (ref 94–109)
CO2 SERPL-SCNC: 22 MMOL/L (ref 20–32)
CREAT SERPL-MCNC: 0.57 MG/DL (ref 0.52–1.04)
DEPRECATED CALCIDIOL+CALCIFEROL SERPL-MC: 16 UG/L (ref 20–75)
EOSINOPHIL # BLD AUTO: 0.2 10E3/UL (ref 0–0.7)
EOSINOPHIL NFR BLD AUTO: 2 %
ERYTHROCYTE [DISTWIDTH] IN BLOOD BY AUTOMATED COUNT: 15.9 % (ref 10–15)
FERRITIN SERPL-MCNC: 21 NG/ML (ref 12–150)
GFR SERPL CREATININE-BSD FRML MDRD: >90 ML/MIN/1.73M2
GLUCOSE BLD-MCNC: 83 MG/DL (ref 70–99)
HCT VFR BLD AUTO: 37.2 % (ref 35–47)
HCV AB SERPL QL IA: NONREACTIVE
HGB BLD-MCNC: 12.1 G/DL (ref 11.7–15.7)
HIV 1+2 AB+HIV1 P24 AG SERPL QL IA: NONREACTIVE
IRON SATN MFR SERPL: 9 % (ref 15–46)
IRON SERPL-MCNC: 32 UG/DL (ref 35–180)
LYMPHOCYTES # BLD AUTO: 2.4 10E3/UL (ref 0.8–5.3)
LYMPHOCYTES NFR BLD AUTO: 29 %
MCH RBC QN AUTO: 23.4 PG (ref 26.5–33)
MCHC RBC AUTO-ENTMCNC: 32.5 G/DL (ref 31.5–36.5)
MCV RBC AUTO: 72 FL (ref 78–100)
MONOCYTES # BLD AUTO: 0.4 10E3/UL (ref 0–1.3)
MONOCYTES NFR BLD AUTO: 5 %
NEUTROPHILS # BLD AUTO: 5.3 10E3/UL (ref 1.6–8.3)
NEUTROPHILS NFR BLD AUTO: 64 %
PLATELET # BLD AUTO: 397 10E3/UL (ref 150–450)
POTASSIUM BLD-SCNC: 3.9 MMOL/L (ref 3.4–5.3)
PROT SERPL-MCNC: 7.6 G/DL (ref 6.8–8.8)
RBC # BLD AUTO: 5.16 10E6/UL (ref 3.8–5.2)
SODIUM SERPL-SCNC: 137 MMOL/L (ref 133–144)
TIBC SERPL-MCNC: 359 UG/DL (ref 240–430)
VIT B12 SERPL-MCNC: 435 PG/ML (ref 232–1245)
WBC # BLD AUTO: 8.3 10E3/UL (ref 4–11)

## 2022-08-11 PROCEDURE — 86803 HEPATITIS C AB TEST: CPT | Performed by: PHYSICIAN ASSISTANT

## 2022-08-11 PROCEDURE — 80053 COMPREHEN METABOLIC PANEL: CPT | Performed by: PHYSICIAN ASSISTANT

## 2022-08-11 PROCEDURE — G0145 SCR C/V CYTO,THINLAYER,RESCR: HCPCS | Performed by: PHYSICIAN ASSISTANT

## 2022-08-11 PROCEDURE — 82607 VITAMIN B-12: CPT | Performed by: PHYSICIAN ASSISTANT

## 2022-08-11 PROCEDURE — 87389 HIV-1 AG W/HIV-1&-2 AB AG IA: CPT | Performed by: PHYSICIAN ASSISTANT

## 2022-08-11 PROCEDURE — 82728 ASSAY OF FERRITIN: CPT | Performed by: PHYSICIAN ASSISTANT

## 2022-08-11 PROCEDURE — 99395 PREV VISIT EST AGE 18-39: CPT | Performed by: PHYSICIAN ASSISTANT

## 2022-08-11 PROCEDURE — 82306 VITAMIN D 25 HYDROXY: CPT | Performed by: PHYSICIAN ASSISTANT

## 2022-08-11 PROCEDURE — 36415 COLL VENOUS BLD VENIPUNCTURE: CPT | Performed by: PHYSICIAN ASSISTANT

## 2022-08-11 PROCEDURE — 83550 IRON BINDING TEST: CPT | Performed by: PHYSICIAN ASSISTANT

## 2022-08-11 PROCEDURE — 85025 COMPLETE CBC W/AUTO DIFF WBC: CPT | Performed by: PHYSICIAN ASSISTANT

## 2022-08-11 ASSESSMENT — ENCOUNTER SYMPTOMS
PALPITATIONS: 0
WEAKNESS: 0
HEMATOCHEZIA: 0
SHORTNESS OF BREATH: 0
COUGH: 0
FEVER: 0
DIZZINESS: 0
HEARTBURN: 0
EYE PAIN: 0
PARESTHESIAS: 0
NAUSEA: 0
JOINT SWELLING: 1
MYALGIAS: 1
ABDOMINAL PAIN: 0
DIARRHEA: 0
CHILLS: 0
FREQUENCY: 0
SORE THROAT: 0
CONSTIPATION: 0
DYSURIA: 0
HEADACHES: 0
ARTHRALGIAS: 1
NERVOUS/ANXIOUS: 0
HEMATURIA: 0

## 2022-08-11 NOTE — PROGRESS NOTES
SUBJECTIVE:   CC: Milla Tinoco is an 27 year old woman who presents for preventive health visit.     Patient has been advised of split billing requirements and indicates understanding: Yes  Healthy Habits:     Getting at least 3 servings of Calcium per day:  Yes    Bi-annual eye exam:  NO    Dental care twice a year:  NO    Sleep apnea or symptoms of sleep apnea:  Daytime drowsiness    Diet:  Regular (no restrictions)    Frequency of exercise:  None    Taking medications regularly:  Yes    Medication side effects:  Not applicable    PHQ-2 Total Score: 0    Additional concerns today:  Yes    Today's PHQ-2 Score:   PHQ-2 ( 1999 Pfizer) 8/11/2022   Q1: Little interest or pleasure in doing things 0   Q2: Feeling down, depressed or hopeless 0   PHQ-2 Score 0   PHQ-2 Total Score (12-17 Years)- Positive if 3 or more points; Administer PHQ-A if positive -   Q1: Little interest or pleasure in doing things Not at all   Q2: Feeling down, depressed or hopeless Not at all   PHQ-2 Score 0       Abuse: Current or Past (Physical, Sexual or Emotional) - No  Do you feel safe in your environment? Yes    Have you ever done Advance Care Planning? (For example, a Health Directive, POLST, or a discussion with a medical provider or your loved ones about your wishes): No, advance care planning information given to patient to review.  Patient declined advance care planning discussion at this time.    Social History     Tobacco Use     Smoking status: Never Smoker     Smokeless tobacco: Never Used   Substance Use Topics     Alcohol use: No       Alcohol Use 8/11/2022   Prescreen: >3 drinks/day or >7 drinks/week? No     Reviewed orders with patient.  Reviewed health maintenance and updated orders accordingly - Yes  Lab work is in process  Labs reviewed in EPIC  BP Readings from Last 3 Encounters:   08/11/22 112/73   02/09/22 113/75   03/16/21 122/80    Wt Readings from Last 3 Encounters:   08/11/22 148.1 kg (326 lb 8 oz)   02/09/22 147.4 kg  (325 lb)   03/16/21 145.2 kg (320 lb)                  Patient Active Problem List   Diagnosis     Morbid obesity (H)     Edema of both legs     Fatigue, unspecified type     Lymphedema     Past Surgical History:   Procedure Laterality Date     GI SURGERY         Social History     Tobacco Use     Smoking status: Never Smoker     Smokeless tobacco: Never Used   Substance Use Topics     Alcohol use: No     History reviewed. No pertinent family history.      Current Outpatient Medications   Medication Sig Dispense Refill     acetaminophen (TYLENOL) 325 MG tablet Take 325-650 mg by mouth every 6 hours as needed for mild pain       diclofenac (VOLTAREN) 1 % topical gel Apply 2 g topically 4 times daily 100 g 1     Allergies   Allergen Reactions     Mushroom      Recent Labs   Lab Test 10/27/20  1653 03/05/19  1212   ALT 32  --    CR 0.57 0.51*   GFRESTIMATED >90 >90   GFRESTBLACK >90 >90   POTASSIUM 4.0 3.7   TSH 2.40  --         Breast Cancer Screening:    Breast CA Risk Assessment (FHS-7) 8/11/2022   Do you have a family history of breast, colon, or ovarian cancer? No / Unknown       Patient under 40 years of age: Routine Mammogram Screening not recommended.   Pertinent mammograms are reviewed under the imaging tab.    History of abnormal Pap smear: NO - age 21-29 PAP every 3 years recommended     Reviewed and updated as needed this visit by clinical staff   Tobacco  Allergies  Meds  Problems  Med Hx  Surg Hx  Fam Hx            Reviewed and updated as needed this visit by Provider   Tobacco  Allergies  Meds  Problems  Med Hx  Surg Hx  Fam Hx             History reviewed. No pertinent past medical history.   Past Surgical History:   Procedure Laterality Date     GI SURGERY         Review of Systems   Constitutional: Negative for chills and fever.   HENT: Negative for congestion, ear pain, hearing loss and sore throat.    Eyes: Negative for pain and visual disturbance.   Respiratory: Negative for cough and  "shortness of breath.    Cardiovascular: Positive for peripheral edema. Negative for chest pain and palpitations.   Gastrointestinal: Negative for abdominal pain, constipation, diarrhea, heartburn, hematochezia and nausea.   Genitourinary: Negative for dysuria, frequency, genital sores, hematuria and urgency.   Musculoskeletal: Positive for arthralgias, joint swelling and myalgias.   Skin: Negative for rash.   Neurological: Negative for dizziness, weakness, headaches and paresthesias.   Psychiatric/Behavioral: Negative for mood changes. The patient is not nervous/anxious.         OBJECTIVE:   /73   Pulse 78   Temp 97.8  F (36.6  C) (Oral)   Ht 1.528 m (5' 0.16\")   Wt 148.1 kg (326 lb 8 oz)   SpO2 99%   BMI 63.43 kg/m    Physical Exam  GENERAL: healthy, alert and no distress  EYES: Eyes grossly normal to inspection, PERRL and conjunctivae and sclerae normal  HENT: ear canals and TM's normal, nose and mouth without ulcers or lesions  NECK: no adenopathy, no asymmetry, masses, or scars and thyroid normal to palpation  RESP: lungs clear to auscultation - no rales, rhonchi or wheezes  BREAST: normal without masses, tenderness or nipple discharge and no palpable axillary masses or adenopathy  CV: regular rate and rhythm, normal S1 S2, no S3 or S4, no murmur, click or rub, no peripheral edema and peripheral pulses strong  ABDOMEN: soft, nontender, no hepatosplenomegaly, no masses and bowel sounds normal   (female): normal female external genitalia, normal urethral meatus, vaginal mucosa pink, moist, well rugated, and normal cervix/adnexa/uterus without masses or discharge; pap smear done today  MS: no gross musculoskeletal defects noted, no edema  SKIN: no suspicious lesions or rashes  NEURO: Normal strength and tone, mentation intact and speech normal  PSYCH: mentation appears normal, affect normal/bright    Diagnostic Test Results:  Labs reviewed in Epic    ASSESSMENT/PLAN:       ICD-10-CM    1. Routine " "general medical examination at a health care facility  Z00.00    2. Encounter for screening for cervical cancer  Z12.4 Pap screen reflex to HPV if ASCUS - recommend age 25 - 29   3. Morbid obesity (H)  E66.01 Comprehensive Weight Management   4. Acute pain of left shoulder  M25.512 diclofenac (VOLTAREN) 1 % topical gel   5. Edema of both legs  R60.0    6. Screening, anemia, deficiency, iron  Z13.0 CBC with Platelets & Differential     Ferritin     Iron & Iron Binding Capacity     CBC with Platelets & Differential     Ferritin     Iron & Iron Binding Capacity   7. Encounter for vitamin deficiency screening  Z13.21 Vitamin D Deficiency     Vitamin B12     Vitamin D Deficiency     Vitamin B12   8. Screening for diabetes mellitus  Z13.1 Comprehensive metabolic panel     Comprehensive metabolic panel   9. Encounter for hepatitis C screening test for low risk patient  Z11.59 Hepatitis C Screen Reflex to HCV RNA Quant and Genotype     Hepatitis C Screen Reflex to HCV RNA Quant and Genotype   10. Screening for HIV without presence of risk factors  Z11.4 HIV Antigen Antibody Combo     HIV Antigen Antibody Combo       Patient has been advised of split billing requirements and indicates understanding: Yes    COUNSELING:  Reviewed preventive health counseling, as reflected in patient instructions       Regular exercise       Healthy diet/nutrition    Estimated body mass index is 63.43 kg/m  as calculated from the following:    Height as of this encounter: 1.528 m (5' 0.16\").    Weight as of this encounter: 148.1 kg (326 lb 8 oz).    Weight management plan: Discussed healthy diet and exercise guidelines    She reports that she has never smoked. She has never used smokeless tobacco.      Counseling Resources:  ATP IV Guidelines  Pooled Cohorts Equation Calculator  Breast Cancer Risk Calculator  BRCA-Related Cancer Risk Assessment: FHS-7 Tool  FRAX Risk Assessment  ICSI Preventive Guidelines  Dietary Guidelines for Americans, " 2010  USDA's MyPlate  ASA Prophylaxis  Lung CA Screening    Florence Alexandra PA-C  [facility]

## 2022-08-12 NOTE — RESULT ENCOUNTER NOTE
"Sandy Milla  Your attached labs are overall within normal limits, but your iron and Vitamin D levels are low.  I recommend taking a daily supplement, anywhere from 2000 to 5000 units and Vitron C one tab daily. This medication is available over the counter. The vit C in the tab enhances the absorption of iron. Please take the medication in between meals and do not combine  with dairy products.    Choose iron-rich foods  Foods rich in iron include:    Red meat, pork and poultry  Seafood  Beans  Dark green leafy vegetables, such as spinach  Dried fruit, such as raisins and apricots  Iron-fortified cereals, breads and pastas  Peas  Your body absorbs more iron from meat than it does from other sources. If you choose to not eat meat, you may need to increase your intake of iron-rich, plant-based foods to absorb the same amount of iron as does someone who eats meat.    Choose foods containing vitamin C to enhance iron absorption  You can enhance your body's absorption of iron by drinking citrus juice or eating other foods rich in vitamin C at the same time that you eat high-iron foods. Vitamin C in citrus juices, like orange juice, helps your body to better absorb dietary iron.    Vitamin C is also found in:    Broccoli  Grapefruit  Kiwi  Leafy greens  Melons  Oranges  Peppers  Strawberries  Tangerines  Tomatoes      Please contact the office with any questions or concerns.    Florence Pereira \"Irvin\" SHIMON Alexandra    "

## 2022-08-15 LAB
BKR LAB AP GYN ADEQUACY: NORMAL
BKR LAB AP GYN INTERPRETATION: NORMAL
BKR LAB AP HPV REFLEX: NORMAL
BKR LAB AP PREVIOUS ABNORMAL: NORMAL
PATH REPORT.COMMENTS IMP SPEC: NORMAL
PATH REPORT.COMMENTS IMP SPEC: NORMAL
PATH REPORT.RELEVANT HX SPEC: NORMAL

## 2022-09-18 ENCOUNTER — HEALTH MAINTENANCE LETTER (OUTPATIENT)
Age: 27
End: 2022-09-18

## 2023-01-21 ENCOUNTER — ANCILLARY PROCEDURE (OUTPATIENT)
Dept: GENERAL RADIOLOGY | Facility: CLINIC | Age: 28
End: 2023-01-21
Attending: NURSE PRACTITIONER
Payer: COMMERCIAL

## 2023-01-21 ENCOUNTER — OFFICE VISIT (OUTPATIENT)
Dept: URGENT CARE | Facility: URGENT CARE | Age: 28
End: 2023-01-21
Payer: COMMERCIAL

## 2023-01-21 VITALS
OXYGEN SATURATION: 99 % | WEIGHT: 293 LBS | RESPIRATION RATE: 16 BRPM | HEART RATE: 73 BPM | TEMPERATURE: 98.8 F | DIASTOLIC BLOOD PRESSURE: 72 MMHG | SYSTOLIC BLOOD PRESSURE: 118 MMHG | BODY MASS INDEX: 64.11 KG/M2

## 2023-01-21 DIAGNOSIS — R20.2 PARESTHESIA: ICD-10-CM

## 2023-01-21 DIAGNOSIS — R73.03 PREDIABETES: ICD-10-CM

## 2023-01-21 DIAGNOSIS — M79.671 RIGHT FOOT PAIN: ICD-10-CM

## 2023-01-21 DIAGNOSIS — M79.671 BILATERAL FOOT PAIN: Primary | ICD-10-CM

## 2023-01-21 DIAGNOSIS — N92.6 LATE MENSES: ICD-10-CM

## 2023-01-21 DIAGNOSIS — M79.672 BILATERAL FOOT PAIN: Primary | ICD-10-CM

## 2023-01-21 LAB
HBA1C MFR BLD: 5.9 % (ref 0–5.6)
HCG UR QL: NEGATIVE

## 2023-01-21 PROCEDURE — 99214 OFFICE O/P EST MOD 30 MIN: CPT | Performed by: NURSE PRACTITIONER

## 2023-01-21 PROCEDURE — 81025 URINE PREGNANCY TEST: CPT | Performed by: NURSE PRACTITIONER

## 2023-01-21 PROCEDURE — 83036 HEMOGLOBIN GLYCOSYLATED A1C: CPT | Performed by: NURSE PRACTITIONER

## 2023-01-21 PROCEDURE — 36415 COLL VENOUS BLD VENIPUNCTURE: CPT | Performed by: NURSE PRACTITIONER

## 2023-01-21 PROCEDURE — 73630 X-RAY EXAM OF FOOT: CPT | Mod: TC | Performed by: RADIOLOGY

## 2023-01-21 RX ORDER — IBUPROFEN 800 MG/1
800 TABLET, FILM COATED ORAL EVERY 8 HOURS PRN
Qty: 30 TABLET | Refills: 0 | Status: SHIPPED | OUTPATIENT
Start: 2023-01-21 | End: 2023-01-31

## 2023-01-21 NOTE — PROGRESS NOTES
Chief Complaint   Patient presents with     Foot Pain     Bilateral foot ache. Pain goes up to the knee and has spasm on the foot onset 5 days ago.          ICD-10-CM    1. Bilateral foot pain  M79.671 ibuprofen (ADVIL/MOTRIN) 800 MG tablet    M79.672       2. Late menses  N92.6 HCG qualitative urine     HCG qualitative urine      3. Right foot pain  M79.671 Hemoglobin A1c     XR Foot Right G/E 3 Views     Hemoglobin A1c     ibuprofen (ADVIL/MOTRIN) 800 MG tablet      4. Paresthesia  R20.2 Hemoglobin A1c     Hemoglobin A1c      5. Prediabetes  R73.03       Unclear cause of foot pain.  Advised patient to wear a hard soled shoe with good support.  Apply ice to the area and take scheduled NSAIDs for the next week to see if this improves symptoms.    New diagnosis of prediabetes.  Patient is instructed to make follow-up appointment with primary care provider to discuss this as well as paresthesias and foot pain if it continues.    Xray - Reviewed and interpreted by me.  X-ray of right foot shows no acute fractures or dislocations.    Results for orders placed or performed in visit on 01/21/23 (from the past 24 hour(s))   HCG qualitative urine   Result Value Ref Range    hCG Urine Qualitative Negative Negative   Hemoglobin A1c   Result Value Ref Range    Hemoglobin A1C 5.9 (H) 0.0 - 5.6 %       Subjective     Milla Tinoco is an 28 year old female who presents to clinic today for foot pain on the right that started 5 days ago.  She denies any injury or trauma.  She also has no history of injury to her feet.  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  She also feels some numbness that comes and goes in the right foot.    Her period is about 1 week late and she request pregnancy test.         Objective    /72 (BP Location: Left arm, Patient Position: Sitting, Cuff Size: Adult Large)   Pulse 73   Temp 98.8  F (37.1  C) (Tympanic)   Resp 16   Wt 149.7 kg (330 lb)   SpO2 99%   BMI 64.11 kg/m    Nurses notes  and VS have been reviewed.    Physical Exam       GENERAL APPEARANCE: alert, mild distress and morbidly obese         ABDOMEN:  soft, nontender, no HSM or masses and bowel sounds normal     MS: extremities normal- no gross deformities noted; normal muscle tone, except for tenderness over the right fourth and fifth metatarsals, no edema is present she has no pain on palpation and no edema left foot.     SKIN: no suspicious lesions or rashes     NEURO: Normal strength and tone, mentation intact and speech normal, normal sensation to feet    Patient Instructions   Take Ibuprofen on a schedule for the next week. Ice the foot/feet 3-4 tim4es a day for 15 minutes or so.      Call and make appointment with primary care provider to follow up and discuss prediabetes.      YOEL Nicholson, CNP  [facility] Provider    The use of Dragon/TRAN.SL dictation services may have been used to construct the content in this note; any grammatical or spelling errors are non-intentional. Please contact the author of this note directly if you are in need of any clarification.

## 2023-01-22 NOTE — PATIENT INSTRUCTIONS
Take Ibuprofen on a schedule for the next week. Ice the foot/feet 3-4 tim4es a day for 15 minutes or so.      Call and make appointment with primary care provider to follow up and discuss prediabetes and paresthesias.

## 2023-01-31 ENCOUNTER — HOSPITAL ENCOUNTER (EMERGENCY)
Facility: CLINIC | Age: 28
Discharge: HOME | End: 2023-01-31
Attending: EMERGENCY MEDICINE | Admitting: EMERGENCY MEDICINE
Payer: COMMERCIAL

## 2023-01-31 VITALS
BODY MASS INDEX: 55.32 KG/M2 | TEMPERATURE: 97.6 F | DIASTOLIC BLOOD PRESSURE: 73 MMHG | SYSTOLIC BLOOD PRESSURE: 134 MMHG | WEIGHT: 293 LBS | RESPIRATION RATE: 18 BRPM | OXYGEN SATURATION: 98 % | HEIGHT: 61 IN | HEART RATE: 118 BPM

## 2023-01-31 DIAGNOSIS — A08.4 VIRAL GASTROENTERITIS: ICD-10-CM

## 2023-01-31 LAB
ALBUMIN SERPL BCG-MCNC: 4.2 G/DL (ref 3.5–5.2)
ALBUMIN UR-MCNC: 30 MG/DL
ALP SERPL-CCNC: 127 U/L (ref 35–104)
ALT SERPL W P-5'-P-CCNC: 25 U/L (ref 10–35)
ANION GAP SERPL CALCULATED.3IONS-SCNC: 14 MMOL/L (ref 7–15)
APPEARANCE UR: CLEAR
AST SERPL W P-5'-P-CCNC: 22 U/L (ref 10–35)
ATRIAL RATE - MUSE: 117 BPM
BACTERIA #/AREA URNS HPF: ABNORMAL /HPF
BASOPHILS # BLD AUTO: 0 10E3/UL (ref 0–0.2)
BASOPHILS NFR BLD AUTO: 0 %
BILIRUB DIRECT SERPL-MCNC: <0.2 MG/DL (ref 0–0.3)
BILIRUB SERPL-MCNC: 0.5 MG/DL
BILIRUB UR QL STRIP: NEGATIVE
BUN SERPL-MCNC: 9.9 MG/DL (ref 6–20)
CALCIUM SERPL-MCNC: 8.8 MG/DL (ref 8.6–10)
CHLORIDE SERPL-SCNC: 98 MMOL/L (ref 98–107)
COLOR UR AUTO: YELLOW
CREAT SERPL-MCNC: 0.57 MG/DL (ref 0.51–0.95)
DEPRECATED HCO3 PLAS-SCNC: 24 MMOL/L (ref 22–29)
DIASTOLIC BLOOD PRESSURE - MUSE: NORMAL MMHG
EOSINOPHIL # BLD AUTO: 0 10E3/UL (ref 0–0.7)
EOSINOPHIL NFR BLD AUTO: 0 %
ERYTHROCYTE [DISTWIDTH] IN BLOOD BY AUTOMATED COUNT: 15.8 % (ref 10–15)
FLUAV RNA SPEC QL NAA+PROBE: NEGATIVE
FLUBV RNA RESP QL NAA+PROBE: NEGATIVE
GFR SERPL CREATININE-BSD FRML MDRD: >90 ML/MIN/1.73M2
GLUCOSE SERPL-MCNC: 109 MG/DL (ref 70–99)
GLUCOSE UR STRIP-MCNC: NEGATIVE MG/DL
HCG SERPL QL: NEGATIVE
HCT VFR BLD AUTO: 41.4 % (ref 35–47)
HGB BLD-MCNC: 12.7 G/DL (ref 11.7–15.7)
HGB UR QL STRIP: NEGATIVE
IMM GRANULOCYTES # BLD: 0.1 10E3/UL
IMM GRANULOCYTES NFR BLD: 1 %
INTERPRETATION ECG - MUSE: NORMAL
KETONES UR STRIP-MCNC: 60 MG/DL
LEUKOCYTE ESTERASE UR QL STRIP: ABNORMAL
LIPASE SERPL-CCNC: 10 U/L (ref 13–60)
LYMPHOCYTES # BLD AUTO: 0.6 10E3/UL (ref 0.8–5.3)
LYMPHOCYTES NFR BLD AUTO: 6 %
MCH RBC QN AUTO: 22.7 PG (ref 26.5–33)
MCHC RBC AUTO-ENTMCNC: 30.7 G/DL (ref 31.5–36.5)
MCV RBC AUTO: 74 FL (ref 78–100)
MONOCYTES # BLD AUTO: 0.4 10E3/UL (ref 0–1.3)
MONOCYTES NFR BLD AUTO: 4 %
MUCOUS THREADS #/AREA URNS LPF: PRESENT /LPF
NEUTROPHILS # BLD AUTO: 9.1 10E3/UL (ref 1.6–8.3)
NEUTROPHILS NFR BLD AUTO: 89 %
NITRATE UR QL: NEGATIVE
NRBC # BLD AUTO: 0 10E3/UL
NRBC BLD AUTO-RTO: 0 /100
P AXIS - MUSE: 56 DEGREES
PH UR STRIP: 6 [PH] (ref 5–7)
PLATELET # BLD AUTO: 390 10E3/UL (ref 150–450)
POTASSIUM SERPL-SCNC: 3.5 MMOL/L (ref 3.4–5.3)
PR INTERVAL - MUSE: 170 MS
PROT SERPL-MCNC: 7.7 G/DL (ref 6.4–8.3)
QRS DURATION - MUSE: 80 MS
QT - MUSE: 316 MS
QTC - MUSE: 440 MS
R AXIS - MUSE: 55 DEGREES
RBC # BLD AUTO: 5.6 10E6/UL (ref 3.8–5.2)
RBC URINE: 4 /HPF
RSV RNA SPEC NAA+PROBE: NEGATIVE
SARS-COV-2 RNA RESP QL NAA+PROBE: NEGATIVE
SODIUM SERPL-SCNC: 136 MMOL/L (ref 136–145)
SP GR UR STRIP: 1.03 (ref 1–1.03)
SQUAMOUS EPITHELIAL: 4 /HPF
SYSTOLIC BLOOD PRESSURE - MUSE: NORMAL MMHG
T AXIS - MUSE: 45 DEGREES
UROBILINOGEN UR STRIP-MCNC: 2 MG/DL
VENTRICULAR RATE- MUSE: 117 BPM
WBC # BLD AUTO: 10.1 10E3/UL (ref 4–11)
WBC URINE: 8 /HPF

## 2023-01-31 PROCEDURE — 82248 BILIRUBIN DIRECT: CPT | Performed by: EMERGENCY MEDICINE

## 2023-01-31 PROCEDURE — 80053 COMPREHEN METABOLIC PANEL: CPT | Performed by: EMERGENCY MEDICINE

## 2023-01-31 PROCEDURE — 99284 EMERGENCY DEPT VISIT MOD MDM: CPT | Mod: CS,25

## 2023-01-31 PROCEDURE — 258N000003 HC RX IP 258 OP 636: Performed by: EMERGENCY MEDICINE

## 2023-01-31 PROCEDURE — 81001 URINALYSIS AUTO W/SCOPE: CPT | Performed by: EMERGENCY MEDICINE

## 2023-01-31 PROCEDURE — C9803 HOPD COVID-19 SPEC COLLECT: HCPCS

## 2023-01-31 PROCEDURE — 36415 COLL VENOUS BLD VENIPUNCTURE: CPT | Performed by: EMERGENCY MEDICINE

## 2023-01-31 PROCEDURE — 87637 SARSCOV2&INF A&B&RSV AMP PRB: CPT | Performed by: EMERGENCY MEDICINE

## 2023-01-31 PROCEDURE — 84703 CHORIONIC GONADOTROPIN ASSAY: CPT | Performed by: EMERGENCY MEDICINE

## 2023-01-31 PROCEDURE — 85014 HEMATOCRIT: CPT | Performed by: EMERGENCY MEDICINE

## 2023-01-31 PROCEDURE — 93005 ELECTROCARDIOGRAM TRACING: CPT

## 2023-01-31 PROCEDURE — 83690 ASSAY OF LIPASE: CPT | Performed by: EMERGENCY MEDICINE

## 2023-01-31 PROCEDURE — 96374 THER/PROPH/DIAG INJ IV PUSH: CPT

## 2023-01-31 PROCEDURE — 96361 HYDRATE IV INFUSION ADD-ON: CPT

## 2023-01-31 PROCEDURE — 85025 COMPLETE CBC W/AUTO DIFF WBC: CPT | Performed by: EMERGENCY MEDICINE

## 2023-01-31 PROCEDURE — 250N000011 HC RX IP 250 OP 636: Performed by: EMERGENCY MEDICINE

## 2023-01-31 RX ORDER — ONDANSETRON 2 MG/ML
4 INJECTION INTRAMUSCULAR; INTRAVENOUS ONCE
Status: COMPLETED | OUTPATIENT
Start: 2023-01-31 | End: 2023-01-31

## 2023-01-31 RX ORDER — ONDANSETRON 4 MG/1
4 TABLET, ORALLY DISINTEGRATING ORAL EVERY 8 HOURS PRN
Qty: 10 TABLET | Refills: 0 | Status: SHIPPED | OUTPATIENT
Start: 2023-01-31 | End: 2023-02-03

## 2023-01-31 RX ADMIN — ONDANSETRON 4 MG: 2 INJECTION INTRAMUSCULAR; INTRAVENOUS at 21:02

## 2023-01-31 RX ADMIN — SODIUM CHLORIDE 1000 ML: 9 INJECTION, SOLUTION INTRAVENOUS at 21:02

## 2023-01-31 ASSESSMENT — ENCOUNTER SYMPTOMS
SORE THROAT: 0
RHINORRHEA: 0
DYSURIA: 0
DIARRHEA: 1
NAUSEA: 1
FEVER: 1
VOMITING: 1
FREQUENCY: 0
COUGH: 0
ABDOMINAL PAIN: 1

## 2023-01-31 NOTE — Clinical Note
Milla Tinoco was seen and treated in our emergency department on 1/31/2023.  She may return to work on 02/03/2023.       If you have any questions or concerns, please don't hesitate to call.      Pratima iRley, RN

## 2023-02-01 NOTE — ED TRIAGE NOTES
Epigastric Abd pain with n/v since yesterday.     Triage Assessment     Row Name 01/31/23 1932       Triage Assessment (Adult)    Airway WDL WDL       Respiratory WDL    Respiratory WDL WDL       Skin Circulation/Temperature WDL    Skin Circulation/Temperature WDL WDL       Cardiac WDL    Cardiac WDL WDL       Peripheral/Neurovascular WDL    Peripheral Neurovascular WDL WDL       Cognitive/Neuro/Behavioral WDL    Cognitive/Neuro/Behavioral WDL WDL

## 2023-02-01 NOTE — ED PROVIDER NOTES
"  History   Chief Complaint:  Abdominal Pain and Nausea & Vomiting     The history is provided by the patient.      Milla Tinoco is a 28 year old female who presents with abdominal pain, nausea and vomiting. Patient reports that he developed vomiting this morning with associated diarrhea and upper abdominal pain. She states that she also had a fever of 102 degrees this morning. She denies rhinorrhea, sore throat or cough. She denies sick contacts. She denies frequency, urgency or dysuria. She denies history of ulcers. No recent suspicious foods.    Review of External Notes:     ROS:  Review of Systems   Constitutional: Positive for fever (resolved).   HENT: Negative for rhinorrhea and sore throat.    Respiratory: Negative for cough.    Gastrointestinal: Positive for abdominal pain, diarrhea, nausea and vomiting.   Genitourinary: Negative for dysuria, frequency and urgency.   All other systems reviewed and are negative.    Allergies:  Mushroom     Medications:    The patient is not currently taking any prescribed medications.    Past Medical History:    Obese  Edema of both legs  Lymphedema     Past Surgical History:    Nonspecific abdominal surgery as an infant     Social History:  Presents alone  Works as a   PCP: [facility]     Physical Exam     Patient Vitals for the past 24 hrs:   BP Temp Temp src Pulse Resp SpO2 Height Weight   01/31/23 2045 134/73 97.6  F (36.4  C) Temporal 118 18 98 % 1.549 m (5' 1\") 146.1 kg (322 lb)        Physical Exam  GENERAL: well developed, pleasant  HEAD: atraumatic  EYES: pupils reactive, extraocular muscles intact, conjunctivae normal  ENT:  mucus membranes moist  NECK:  trachea midline, normal range of motion  RESPIRATORY: no tachypnea, breath sounds clear to auscultation   CVS: normal S1/S2, no murmurs, intact distal pulses  ABDOMEN: soft, mild epigastric tenderness, no RUQ tenderness, nondistention  MUSCULOSKELETAL: no deformities  SKIN: " warm and dry, no acute rashes or ulceration  NEURO: GCS 15, cranial nerves intact, alert and oriented x3  PSYCH:  Mood/affect normal    Emergency Department Course   Laboratory:  Labs Ordered and Resulted from Time of ED Arrival to Time of ED Departure   BASIC METABOLIC PANEL - Abnormal       Result Value    Sodium 136      Potassium 3.5      Chloride 98      Carbon Dioxide (CO2) 24      Anion Gap 14      Urea Nitrogen 9.9      Creatinine 0.57      Calcium 8.8      Glucose 109 (*)     GFR Estimate >90     HEPATIC FUNCTION PANEL - Abnormal    Protein Total 7.7      Albumin 4.2      Bilirubin Total 0.5      Alkaline Phosphatase 127 (*)     AST 22      ALT 25      Bilirubin Direct <0.20     LIPASE - Abnormal    Lipase 10 (*)    ROUTINE UA WITH MICROSCOPIC REFLEX TO CULTURE - Abnormal    Color Urine Yellow      Appearance Urine Clear      Glucose Urine Negative      Bilirubin Urine Negative      Ketones Urine 60 (*)     Specific Gravity Urine 1.030      Blood Urine Negative      pH Urine 6.0      Protein Albumin Urine 30 (*)     Urobilinogen Urine 2.0      Nitrite Urine Negative      Leukocyte Esterase Urine Small (*)     Bacteria Urine Few (*)     Mucus Urine Present (*)     RBC Urine 4 (*)     WBC Urine 8 (*)     Squamous Epithelials Urine 4 (*)    CBC WITH PLATELETS AND DIFFERENTIAL - Abnormal    WBC Count 10.1      RBC Count 5.60 (*)     Hemoglobin 12.7      Hematocrit 41.4      MCV 74 (*)     MCH 22.7 (*)     MCHC 30.7 (*)     RDW 15.8 (*)     Platelet Count 390      % Neutrophils 89      % Lymphocytes 6      % Monocytes 4      % Eosinophils 0      % Basophils 0      % Immature Granulocytes 1      NRBCs per 100 WBC 0      Absolute Neutrophils 9.1 (*)     Absolute Lymphocytes 0.6 (*)     Absolute Monocytes 0.4      Absolute Eosinophils 0.0      Absolute Basophils 0.0      Absolute Immature Granulocytes 0.1      Absolute NRBCs 0.0     HCG QUALITATIVE PREGNANCY - Normal    hCG Serum Qualitative Negative         Emergency Department Course & Assessments:     Interventions:  Medications   ondansetron (ZOFRAN) injection 4 mg (4 mg Intravenous Given 1/31/23 2102)   0.9% sodium chloride BOLUS (0 mLs Intravenous Stopped 1/31/23 2246)      Social Determinants of Health affecting care:  na    Assessments:  2239 I obtained history and examined the patient, as above. Amenable to discharge.    Disposition:  The patient was discharged to home.     Impression & Plan    Medical Decision Making:    Patient presents with episode of nausea vomiting and diarrhea that started today.  She has some mild epigastric pain.  On exam she has no right upper quadrant pain.  Patient given fluids and Zofran and feeling improved.  Certainly sounds to be viral in nature.  Do not feel need an ultrasound or CT at this time.  Discussed supportive care with her prescription for Zofran.    Diagnosis:    ICD-10-CM    1. Viral gastroenteritis  A08.4            Discharge Medications:  Discharge Medication List as of 1/31/2023 10:49 PM      START taking these medications    Details   ondansetron (ZOFRAN ODT) 4 MG ODT tab Take 1 tablet (4 mg) by mouth every 8 hours as needed for nausea, Disp-10 tablet, R-0, E-Prescribe            Scribe Disclosure:  I, Franci Castellano, am serving as a scribe at 10:46 PM on 1/31/2023 to document services personally performed by Naga Hoover MD based on my observations and the provider's statements to me.     1/31/2023   Naga Hoover MD Adams, Shaun L, MD  02/01/23 0025

## 2023-02-01 NOTE — ED TRIAGE NOTES
[facility]  ED Arrival Note    Arrives through triage. ABC's intact. A &O X4. . Pt arrives with c/o epigastric pain accompanied with nausea and vomiting. Symptoms started shortly after getting to work. Patient reports she has not been able to tolerated PO intake today.       Visitors during triage: None      Triage Interventions: EKG and IV and labs    Ambulatory: Yes    Meets Stroke Criteria?: No    Meets Trauma Criteria?: No    Shock Index: >0.8, for provider reference    Directed to: Triage Lobby    Pronouns: she/her       Triage Assessment     Row Name 01/31/23 2046       Triage Assessment (Adult)    Airway WDL WDL       Respiratory WDL    Respiratory WDL WDL       Skin Circulation/Temperature WDL    Skin Circulation/Temperature WDL WDL       Cardiac WDL    Cardiac WDL WDL       Peripheral/Neurovascular WDL    Peripheral Neurovascular WDL WDL       Cognitive/Neuro/Behavioral WDL    Cognitive/Neuro/Behavioral WDL WDL    Row Name 01/31/23 1932       Triage Assessment (Adult)    Airway WDL WDL       Respiratory WDL    Respiratory WDL WDL       Skin Circulation/Temperature WDL    Skin Circulation/Temperature WDL WDL       Cardiac WDL    Cardiac WDL WDL       Peripheral/Neurovascular WDL    Peripheral Neurovascular WDL WDL       Cognitive/Neuro/Behavioral WDL    Cognitive/Neuro/Behavioral WDL WDL

## 2023-09-11 ENCOUNTER — OFFICE VISIT (OUTPATIENT)
Dept: URGENT CARE | Facility: URGENT CARE | Age: 28
End: 2023-09-11
Payer: COMMERCIAL

## 2023-09-11 ENCOUNTER — ANCILLARY PROCEDURE (OUTPATIENT)
Dept: GENERAL RADIOLOGY | Facility: CLINIC | Age: 28
End: 2023-09-11
Attending: PHYSICIAN ASSISTANT
Payer: COMMERCIAL

## 2023-09-11 VITALS
WEIGHT: 293 LBS | DIASTOLIC BLOOD PRESSURE: 79 MMHG | TEMPERATURE: 96.9 F | HEART RATE: 87 BPM | OXYGEN SATURATION: 98 % | SYSTOLIC BLOOD PRESSURE: 145 MMHG | BODY MASS INDEX: 58.95 KG/M2

## 2023-09-11 DIAGNOSIS — R06.02 SOB (SHORTNESS OF BREATH): ICD-10-CM

## 2023-09-11 DIAGNOSIS — R06.02 SOB (SHORTNESS OF BREATH): Primary | ICD-10-CM

## 2023-09-11 DIAGNOSIS — U09.9 LONG COVID: ICD-10-CM

## 2023-09-11 PROCEDURE — 99213 OFFICE O/P EST LOW 20 MIN: CPT

## 2023-09-11 PROCEDURE — 71046 X-RAY EXAM CHEST 2 VIEWS: CPT | Mod: TC | Performed by: RADIOLOGY

## 2023-09-11 RX ORDER — BENZONATATE 100 MG/1
CAPSULE ORAL
Qty: 45 CAPSULE | Refills: 0 | Status: SHIPPED | OUTPATIENT
Start: 2023-09-11

## 2023-09-11 RX ORDER — ALBUTEROL SULFATE 90 UG/1
2 AEROSOL, METERED RESPIRATORY (INHALATION) EVERY 6 HOURS PRN
Qty: 18 G | Refills: 0 | Status: SHIPPED | OUTPATIENT
Start: 2023-09-11

## 2023-09-11 RX ORDER — PREDNISONE 20 MG/1
TABLET ORAL
Qty: 13 TABLET | Refills: 0 | Status: SHIPPED | OUTPATIENT
Start: 2023-09-11 | End: 2023-09-21

## 2023-09-11 NOTE — PROGRESS NOTES
Chief Complaint   Patient presents with    Urgent Care    Cough     Persistent Cough [id number], hard to breath. Pt was positive for covid the beginning of August.       ASSESSMENT/PLAN:  Milla was seen today for urgent care and cough.    Diagnoses and all orders for this visit:    SOB (shortness of breath)  -     XR Chest 2 Views; Future    Long COVID  -     predniSONE (DELTASONE) 20 MG tablet; Take 2 tablets (40 mg) by mouth daily for 4 days, THEN 1 tablet (20 mg) daily for 3 days, THEN 0.5 tablets (10 mg) daily for 3 days.  -     benzonatate (TESSALON) 100 MG capsule; Take 1-2 capsules by mouth up to 3 x a day as needed with food  -     albuterol (PROAIR HFA/PROVENTIL HFA/VENTOLIN HFA) 108 (90 Base) MCG/ACT inhaler; Inhale 2 puffs into the lungs every 6 hours as needed for shortness of breath or wheezing    Consistent with long COVID postviral cough syndrome.  Start on prednisone above.  Albuterol as needed for any underlying bronchospasm Tessalon to help with the cough.  Chest x-ray is within normal limits.  No evidence of pneumonia    Dalton Trujillo PA-C      SUBJECTIVE:  Milla is a 28 year old female who presents to urgent care with cough..  She had this for about a month and it started when she had COVID-19.  She had other symptoms including sore throat, headache, fevers, chills body aches and these all resolved.  She has some minimal shortness of breath at times but no chest pain.  No wheezing.  Cough is persistent.  Seems to be worse at work where it is very dry air.    ROS: Pertinent ROS neg other than the symptoms noted above in the HPI.     OBJECTIVE:  BP (!) 145/79   Pulse 87   Temp 96.9  F (36.1  C) (Tympanic)   Wt 141.5 kg (312 lb)   SpO2 98%   BMI 58.95 kg/m     GENERAL: healthy, alert and no distress  EYES: Eyes grossly normal to inspection, PERRL and conjunctivae and sclerae normal  HENT: Nasal passages clear, nose and mouth without ulcers or lesions  RESP: lungs clear to auscultation - no rales,  rhonchi or wheezes, frequent cough  CV: regular rate and rhythm, normal S1 S2, no S3 or S4, no murmur, click or rub    DIAGNOSTICS  Xray - Reviewed and interpreted by me.  No acute cardiopulmonary abnormality noted  Results for orders placed or performed in visit on 09/11/23   XR Chest 2 Views     Status: None    Narrative    EXAM: XR CHEST 2 VIEWS  LOCATION: [facility]  DATE: 9/11/2023    INDICATION: Covid x 1 month ago, persisitent SOB  COMPARISON: 03/05/2019      Impression    IMPRESSION: Lungs are clear. Heart and pulmonary vascularity are normal. No signs of acute disease.        Current Outpatient Medications   Medication    acetaminophen (TYLENOL) 325 MG tablet     No current facility-administered medications for this visit.      Patient Active Problem List   Diagnosis    Morbid obesity (H)    Edema of both legs    Fatigue, unspecified type    Lymphedema      No past medical history on file.  Past Surgical History:   Procedure Laterality Date    GI SURGERY       No family history on file.  Social History     Tobacco Use    Smoking status: Never    Smokeless tobacco: Never   Substance Use Topics    Alcohol use: No              The plan of care was discussed with the patient. They understand and agree with the course of treatment prescribed. A printed summary was given including instructions and medications.  The use of Dragon/InExchange dictation services may have been used to construct the content in this note; any grammatical or spelling errors are non-intentional. Please contact the author of this note directly if you are in need of any clarification.

## 2023-10-08 ENCOUNTER — HEALTH MAINTENANCE LETTER (OUTPATIENT)
Age: 28
End: 2023-10-08

## 2024-01-16 ENCOUNTER — OFFICE VISIT (OUTPATIENT)
Dept: URGENT CARE | Facility: URGENT CARE | Age: 29
End: 2024-01-16
Payer: COMMERCIAL

## 2024-01-16 VITALS
TEMPERATURE: 97.5 F | OXYGEN SATURATION: 100 % | DIASTOLIC BLOOD PRESSURE: 82 MMHG | SYSTOLIC BLOOD PRESSURE: 128 MMHG | HEART RATE: 71 BPM

## 2024-01-16 DIAGNOSIS — H81.10 BENIGN PAROXYSMAL POSITIONAL VERTIGO, UNSPECIFIED LATERALITY: Primary | ICD-10-CM

## 2024-01-16 DIAGNOSIS — R09.81 CONGESTION OF PARANASAL SINUS: ICD-10-CM

## 2024-01-16 DIAGNOSIS — J06.9 UPPER RESPIRATORY TRACT INFECTION, UNSPECIFIED TYPE: ICD-10-CM

## 2024-01-16 LAB
DEPRECATED S PYO AG THROAT QL EIA: NEGATIVE
FLUAV AG SPEC QL IA: NEGATIVE
FLUBV AG SPEC QL IA: NEGATIVE
GROUP A STREP BY PCR: NOT DETECTED

## 2024-01-16 PROCEDURE — 87635 SARS-COV-2 COVID-19 AMP PRB: CPT | Performed by: EMERGENCY MEDICINE

## 2024-01-16 PROCEDURE — 87651 STREP A DNA AMP PROBE: CPT | Performed by: EMERGENCY MEDICINE

## 2024-01-16 PROCEDURE — 99214 OFFICE O/P EST MOD 30 MIN: CPT | Performed by: EMERGENCY MEDICINE

## 2024-01-16 PROCEDURE — 87804 INFLUENZA ASSAY W/OPTIC: CPT | Performed by: EMERGENCY MEDICINE

## 2024-01-16 RX ORDER — FLUTICASONE PROPIONATE 50 MCG
1 SPRAY, SUSPENSION (ML) NASAL DAILY
Qty: 9.9 ML | Refills: 0 | Status: SHIPPED | OUTPATIENT
Start: 2024-01-16

## 2024-01-16 RX ORDER — MECLIZINE HYDROCHLORIDE 25 MG/1
25 TABLET ORAL 3 TIMES DAILY PRN
Qty: 25 TABLET | Refills: 0 | Status: SHIPPED | OUTPATIENT
Start: 2024-01-16

## 2024-01-16 NOTE — PROGRESS NOTES
Assessment & Plan     Diagnosis:    ICD-10-CM    1. Benign paroxysmal positional vertigo, unspecified laterality  H81.10 meclizine (ANTIVERT) 25 MG tablet      2. Upper respiratory tract infection, unspecified type  J06.9 Influenza A & B Antigen - Clinic Collect     Symptomatic COVID-19 Virus (Coronavirus) by PCR Nose     Streptococcus A Rapid Screen w/Reflex to PCR - Clinic Collect     Group A Streptococcus PCR Throat Swab      3. Congestion of paranasal sinus  R09.81 fluticasone (FLONASE) 50 MCG/ACT nasal spray          Medical Decision Making:  Milla Tinoco is a 29 year old female who presents for evaluation of vertigo. The differential diagnosis of vertigo is broad and includes common etiologies such as meniere's disease, labyrinthitis, benign positional vertigo, otitis media, etc.  More serious etiologies considered include central etiologies such as tumor, intracerebral bleed, dissection, ischemic cerebral vascular accident.  The history, physical exam including detailed neurologic exam, and workup in the urgent care suggests a benign cause of vertigo today.  Likely peripheral vertigo given recent URI symptoms, suspect eustachian tube dysfunction.  Recommended Flonase and Antivert, can try Epley or Cawthorne exercises. Rapid strep and flu testing negative. Strep PCR and COVID-19 PCR pending at this time.    No indication for advanced imaging at this point (CT/MRI) as there are no definite signs of a central and concerning etiology for the vertigo.      Vertigo precautions given for home.        [facility]    Subjective     Milla Tinoco is a 29 year old female who presents to clinic today for the following health issues:  Chief Complaint   Patient presents with    Shortness of Breath     X2 days    Dizziness     X2 days. Pt reports insomnia for 2 days also.       HPI    Patient with URI symptoms for the past week, seems to be improving. Now she is experiencing dizziness  "described as \"the room spinning\" mainly when changing positions or turning in bed. No severe headache, vision changes, lightheadedness, neck pain or stiffness, numbness or weakness, slurred speech, confusion, chest pain, ear pain (some fullness in the ears and nasal congestion however). shortness of breath or other concerns.    Review of Systems    See HPI    Objective      Vitals: /82   Pulse 71   Temp 97.5  F (36.4  C) (Tympanic)   SpO2 100%   Resp: 1    Patient Vitals for the past 24 hrs:   BP Temp Temp src Pulse SpO2   01/16/24 1253 128/82 97.5  F (36.4  C) Tympanic 71 100 %       Vital signs reviewed by: Prieto Nuñez PA-C    Physical Exam   Constitutional: Patient is alert and cooperative. No acute distress.  Ears: Right TM is slightly bulging; no erythema. Left TM is danish. External ear canals are normal.  Mouth: Mucous membranes are moist. Normal tongue and tonsil. Posterior oropharynx is clear.  Eyes: Conjunctivae, EOMI and lids are normal. PERRL.  Cardiovascular: Regular rate and rhythm  Pulmonary/Chest: Lungs are clear to auscultation throughout. Effort normal. No respiratory distress. No wheezes, rales or rhonchi.  Neurological: Alert and oriented x3. CN 3-7 and 9-12 intact. Strength and sensation are intact and symmetric in the bilateral upper and lower extremities. Gait stable. Speech fluent and face symmetric.  Skin: No rash noted on visualized skin.  Psychiatric:The patient has a normal mood and affect.     Labs/Imaging:  Results for orders placed or performed in visit on 01/16/24   Influenza A & B Antigen - Clinic Collect     Status: Normal    Specimen: Nose; Swab   Result Value Ref Range    Influenza A antigen Negative Negative    Influenza B antigen Negative Negative    Narrative    Test results must be correlated with clinical data. If necessary, results should be confirmed by a molecular assay or viral culture.   Streptococcus A Rapid Screen w/Reflex to PCR - Clinic Collect     " Status: Normal    Specimen: Throat; Swab   Result Value Ref Range    Group A Strep antigen Negative Negative     COVID-19 PCR: Pending        Prieto Nuñez PA-C, January 16, 2024

## 2024-01-17 LAB — SARS-COV-2 RNA RESP QL NAA+PROBE: NEGATIVE

## 2024-03-09 ENCOUNTER — APPOINTMENT (OUTPATIENT)
Dept: GENERAL RADIOLOGY | Facility: CLINIC | Age: 29
End: 2024-03-09
Attending: EMERGENCY MEDICINE
Payer: COMMERCIAL

## 2024-03-09 ENCOUNTER — HOSPITAL ENCOUNTER (EMERGENCY)
Facility: CLINIC | Age: 29
Discharge: HOME | End: 2024-03-09
Attending: EMERGENCY MEDICINE | Admitting: EMERGENCY MEDICINE
Payer: COMMERCIAL

## 2024-03-09 VITALS
WEIGHT: 293 LBS | BODY MASS INDEX: 55.32 KG/M2 | HEIGHT: 61 IN | SYSTOLIC BLOOD PRESSURE: 149 MMHG | HEART RATE: 99 BPM | TEMPERATURE: 98.4 F | DIASTOLIC BLOOD PRESSURE: 92 MMHG | OXYGEN SATURATION: 96 % | RESPIRATION RATE: 16 BRPM

## 2024-03-09 DIAGNOSIS — J06.9 UPPER RESPIRATORY TRACT INFECTION, UNSPECIFIED TYPE: ICD-10-CM

## 2024-03-09 LAB
FLUAV RNA SPEC QL NAA+PROBE: NEGATIVE
FLUBV RNA RESP QL NAA+PROBE: NEGATIVE
GROUP A STREP BY PCR: NOT DETECTED
RSV RNA SPEC NAA+PROBE: NEGATIVE
SARS-COV-2 RNA RESP QL NAA+PROBE: NEGATIVE

## 2024-03-09 PROCEDURE — 250N000009 HC RX 250: Performed by: EMERGENCY MEDICINE

## 2024-03-09 PROCEDURE — 87637 SARSCOV2&INF A&B&RSV AMP PRB: CPT | Performed by: EMERGENCY MEDICINE

## 2024-03-09 PROCEDURE — 99284 EMERGENCY DEPT VISIT MOD MDM: CPT | Mod: 25

## 2024-03-09 PROCEDURE — 71046 X-RAY EXAM CHEST 2 VIEWS: CPT

## 2024-03-09 PROCEDURE — 87651 STREP A DNA AMP PROBE: CPT | Performed by: EMERGENCY MEDICINE

## 2024-03-09 RX ORDER — IPRATROPIUM BROMIDE AND ALBUTEROL SULFATE 2.5; .5 MG/3ML; MG/3ML
3 SOLUTION RESPIRATORY (INHALATION) ONCE
Status: COMPLETED | OUTPATIENT
Start: 2024-03-09 | End: 2024-03-09

## 2024-03-09 RX ORDER — ALBUTEROL SULFATE 90 UG/1
2 AEROSOL, METERED RESPIRATORY (INHALATION) EVERY 6 HOURS PRN
Qty: 18 G | Refills: 0 | Status: SHIPPED | OUTPATIENT
Start: 2024-03-09

## 2024-03-09 RX ADMIN — IPRATROPIUM BROMIDE AND ALBUTEROL SULFATE 3 ML: .5; 3 SOLUTION RESPIRATORY (INHALATION) at 07:35

## 2024-03-09 ASSESSMENT — COLUMBIA-SUICIDE SEVERITY RATING SCALE - C-SSRS
1. IN THE PAST MONTH, HAVE YOU WISHED YOU WERE DEAD OR WISHED YOU COULD GO TO SLEEP AND NOT WAKE UP?: NO
2. HAVE YOU ACTUALLY HAD ANY THOUGHTS OF KILLING YOURSELF IN THE PAST MONTH?: NO
6. HAVE YOU EVER DONE ANYTHING, STARTED TO DO ANYTHING, OR PREPARED TO DO ANYTHING TO END YOUR LIFE?: NO

## 2024-03-09 ASSESSMENT — ACTIVITIES OF DAILY LIVING (ADL)
ADLS_ACUITY_SCORE: 35
ADLS_ACUITY_SCORE: 35

## 2024-03-09 NOTE — ED TRIAGE NOTES
Here with several days of cough, sore throat and some mild SOB. Son tested negative for strep and influenza this week     Triage Assessment (Adult)       Row Name 03/09/24 0712          Triage Assessment    Airway WDL WDL        Respiratory WDL    Respiratory WDL X;cough  productive of greenish sputum        Skin Circulation/Temperature WDL    Skin Circulation/Temperature WDL WDL        Cardiac WDL    Cardiac WDL WDL        Peripheral/Neurovascular WDL    Peripheral Neurovascular WDL WDL        Cognitive/Neuro/Behavioral WDL    Cognitive/Neuro/Behavioral WDL WDL

## 2024-03-09 NOTE — ED PROVIDER NOTES
"    History     Chief Complaint:  Cough, Pharyngitis, and Shortness of Breath       HPI   Milla Tinoco is a 29 year old female who presents to the ED for evaluation of URI symptoms for 1 week.  She says her son had symptoms previously last week.  He tested negative for strep and influenza.  The patient says that she has a sore throat.  She is coughing yellow mucus and sputum which has been persistent.  She has had 2 or 3 episodes of posttussive emesis.  She feels that her congestion keeps her from sleeping at night which ultimately brought her in today.  She denies any fever.  No abdominal pain.  No diarrhea.  No skin rash.  No other specific known ill contacts.  She denies smoking or vaping or using marijuana.      Review of External Notes:  Urgent care note reviewed from January 16, 2024 when the patient was also seen for URI.    Medications:    albuterol (PROAIR HFA/PROVENTIL HFA/VENTOLIN HFA) 108 (90 Base) MCG/ACT inhaler  acetaminophen (TYLENOL) 325 MG tablet  albuterol (PROAIR HFA/PROVENTIL HFA/VENTOLIN HFA) 108 (90 Base) MCG/ACT inhaler  benzonatate (TESSALON) 100 MG capsule  fluticasone (FLONASE) 50 MCG/ACT nasal spray  meclizine (ANTIVERT) 25 MG tablet        Past Medical History:    No past medical history on file.    Past Surgical History:    Past Surgical History:   Procedure Laterality Date    GI SURGERY            Physical Exam   Patient Vitals for the past 24 hrs:   BP Temp Temp src Pulse Resp SpO2 Height Weight   03/09/24 0752 -- -- -- -- -- 96 % -- --   03/09/24 0742 -- -- -- -- -- 100 % -- --   03/09/24 0732 -- -- -- -- -- 97 % -- --   03/09/24 0714 (!) 149/92 98.4  F (36.9  C) Oral 99 16 -- 1.549 m (5' 1\") 149.7 kg (330 lb)        Physical Exam  Constitutional:       General: She is not in acute distress.     Appearance: Normal appearance. She is not diaphoretic.   HENT:      Head: Atraumatic.      Right Ear: Tympanic membrane, ear canal and external ear normal.      Left Ear: Tympanic membrane, " ear canal and external ear normal.      Mouth/Throat:      Mouth: Mucous membranes are moist.      Comments: Bilateral enlargement erythema the tonsils.  No exudate.  Uvula midline.  No evidence of peritonsillar or retropharyngeal abscess.  Tongue is normal.  No elevation of the floor the mouth or tenderness to the anterior neck or submandibular induration.  Eyes:      General: No scleral icterus.     Conjunctiva/sclera: Conjunctivae normal.      Pupils: Pupils are equal, round, and reactive to light.   Cardiovascular:      Rate and Rhythm: Normal rate and regular rhythm.      Heart sounds: Normal heart sounds.   Pulmonary:      Effort: No respiratory distress.      Breath sounds: Normal breath sounds.   Abdominal:      General: Abdomen is flat. There is no distension.      Tenderness: There is no abdominal tenderness.   Musculoskeletal:      Cervical back: Neck supple.   Skin:     General: Skin is warm.      Findings: No rash.   Neurological:      Mental Status: She is alert.           Emergency Department Course     Imaging:  XR Chest 2 Views   Final Result   IMPRESSION: Negative chest.          Laboratory:  Labs Ordered and Resulted from Time of ED Arrival to Time of ED Departure   INFLUENZA A/B, RSV, & SARS-COV2 PCR - Normal       Result Value    Influenza A PCR Negative      Influenza B PCR Negative      RSV PCR Negative      SARS CoV2 PCR Negative     GROUP A STREPTOCOCCUS PCR THROAT SWAB - Normal    Group A strep by PCR Not Detected          Emergency Department Course & Assessments:    Interventions:  Medications   ipratropium - albuterol 0.5 mg/2.5 mg/3 mL (DUONEB) neb solution 3 mL (3 mLs Nebulization $Given 3/9/24 5229)      Independent Interpretation (X-rays, CTs, rhythm strip):  Chest x-ray independently interpreted.  No pneumothorax or infiltrate.     Disposition:  The patient was discharged.    Impression & Plan      Medical Decision Making:  This patient is a 29-year-old who presents to the ED with  URI symptoms.  Chest x-ray is clear.  Viral panel negative.  Strep screen is negative.  The patient feels improved with a nebulizer treatment.  Most likely this is a URI with viral bronchitis.  She will have an albuterol inhaler for use at home and continue to follow with her physician.        Diagnosis:    ICD-10-CM    1. Upper respiratory tract infection, unspecified type  J06.9            Discharge Medications:  New Prescriptions    ALBUTEROL (PROAIR HFA/PROVENTIL HFA/VENTOLIN HFA) 108 (90 BASE) MCG/ACT INHALER    Inhale 2 puffs into the lungs every 6 hours as needed for shortness of breath, wheezing or cough            3/9/2024   Bill Nobles MD McRoberts, Sean Edward, MD  03/09/24 0874

## 2024-04-04 ENCOUNTER — PATIENT OUTREACH (OUTPATIENT)
Dept: CARE COORDINATION | Facility: CLINIC | Age: 29
End: 2024-04-04
Payer: COMMERCIAL

## 2024-04-04 NOTE — PROGRESS NOTES
Clinic Care Coordination Contact  Program:   [facility]: [place]  Renewal: [facility]   Date Applied:      BHUPINDER Outreach:   4/4/24:  1st outreach attempt. Left a message on voicemail with call back information and requested return call.  Plan: CTA will call again within 2 weeks.  Radha Sethi  Care   [facility]  Clinic Care Coordination  [phone]       Health Insurance:        Referral/Screening:

## 2024-04-22 ENCOUNTER — PATIENT OUTREACH (OUTPATIENT)
Dept: CARE COORDINATION | Facility: CLINIC | Age: 29
End: 2024-04-22
Payer: COMMERCIAL

## 2024-04-22 NOTE — PROGRESS NOTES
Clinic Care Coordination Contact  Program:   [facility]: [place]  Renewal: [facility]   Date Applied:      BHUPINDER Outreach:   4/22/24: 2nd outreach attempt. Left message on voicemail indicating last outreach attempt. CTA left [facility] number for renewal follow up.  Plan: CTA will no longer make outreach  Radha Camargo   [facility]  Clinic Care Coordination  [phone]    4/4/24:  1st outreach attempt. Left a message on voicemail with call back information and requested return call.  Plan: CTA will call again within 2 weeks.  Radha Camargo   [facility]  Clinic Care Coordination  [phone]       Health Insurance:        Referral/Screening:

## 2024-06-28 ENCOUNTER — OFFICE VISIT (OUTPATIENT)
Dept: URGENT CARE | Facility: URGENT CARE | Age: 29
End: 2024-06-28
Payer: COMMERCIAL

## 2024-06-28 VITALS
TEMPERATURE: 97.8 F | WEIGHT: 293 LBS | HEART RATE: 87 BPM | BODY MASS INDEX: 65.34 KG/M2 | DIASTOLIC BLOOD PRESSURE: 79 MMHG | OXYGEN SATURATION: 98 % | SYSTOLIC BLOOD PRESSURE: 141 MMHG

## 2024-06-28 DIAGNOSIS — B96.89 BV (BACTERIAL VAGINOSIS): ICD-10-CM

## 2024-06-28 DIAGNOSIS — N76.0 BV (BACTERIAL VAGINOSIS): ICD-10-CM

## 2024-06-28 DIAGNOSIS — N39.0 URINARY TRACT INFECTION WITH HEMATURIA, SITE UNSPECIFIED: Primary | ICD-10-CM

## 2024-06-28 DIAGNOSIS — R31.9 URINARY TRACT INFECTION WITH HEMATURIA, SITE UNSPECIFIED: Primary | ICD-10-CM

## 2024-06-28 LAB
ALBUMIN UR-MCNC: NEGATIVE MG/DL
APPEARANCE UR: CLEAR
BACTERIA #/AREA URNS HPF: ABNORMAL /HPF
BILIRUB UR QL STRIP: NEGATIVE
CLUE CELLS: PRESENT
COLOR UR AUTO: YELLOW
GLUCOSE UR STRIP-MCNC: NEGATIVE MG/DL
HGB UR QL STRIP: ABNORMAL
KETONES UR STRIP-MCNC: NEGATIVE MG/DL
LEUKOCYTE ESTERASE UR QL STRIP: ABNORMAL
NITRATE UR QL: NEGATIVE
PH UR STRIP: 6 [PH] (ref 5–7)
RBC #/AREA URNS AUTO: ABNORMAL /HPF
SP GR UR STRIP: >=1.03 (ref 1–1.03)
SQUAMOUS #/AREA URNS AUTO: ABNORMAL /LPF
TRICHOMONAS, WET PREP: ABNORMAL
UROBILINOGEN UR STRIP-ACNC: 0.2 E.U./DL
WBC #/AREA URNS AUTO: ABNORMAL /HPF
WBC'S/HIGH POWER FIELD, WET PREP: ABNORMAL
YEAST, WET PREP: ABNORMAL

## 2024-06-28 PROCEDURE — 99214 OFFICE O/P EST MOD 30 MIN: CPT | Performed by: PHYSICIAN ASSISTANT

## 2024-06-28 PROCEDURE — 87210 SMEAR WET MOUNT SALINE/INK: CPT | Performed by: PHYSICIAN ASSISTANT

## 2024-06-28 PROCEDURE — 81001 URINALYSIS AUTO W/SCOPE: CPT | Performed by: PHYSICIAN ASSISTANT

## 2024-06-28 PROCEDURE — 87086 URINE CULTURE/COLONY COUNT: CPT | Performed by: PHYSICIAN ASSISTANT

## 2024-06-28 RX ORDER — NITROFURANTOIN 25; 75 MG/1; MG/1
100 CAPSULE ORAL 2 TIMES DAILY
Qty: 14 CAPSULE | Refills: 0 | Status: SHIPPED | OUTPATIENT
Start: 2024-06-28

## 2024-06-28 RX ORDER — METRONIDAZOLE 7.5 MG/G
1 GEL VAGINAL DAILY
Qty: 25 G | Refills: 0 | Status: SHIPPED | OUTPATIENT
Start: 2024-06-28 | End: 2024-07-03

## 2024-06-28 NOTE — PROGRESS NOTES
Assessment & Plan     Urinary tract infection with hematuria, site unspecified    You have been diagnosed with a UTI.  This is one of the most common infections in women because women have a shorter urethra than men. Bacteria have a shorter distance to travel to reach the bladder.. Women who have gone through menopause also lose the protection from estrogen that lowers the chance of getting a UTI. And some women are at higher risk because of their genes. The most common cause of bladder infections is bacteria from the bowels. The bacteria get onto the skin around the opening of the urethra. From there, they can get into the urine. Then they travel up to the bladder, causing inflammation and infection.  Make sure you urinate after sex, drink plenty of fluids and do not hold in your urine.  We have started you on antibiotics for infection and we are awaiting urine culture results, if there is antibiotic resistance on the culture we will call you and switch antibiotics.     UA is POS  Urine Culture pending  - UA Macroscopic with reflex to Microscopic and Culture - Clinic Collect  - Wet prep - Clinic Collect  - UA Microscopic with Reflex to Culture  - Urine Culture  - nitroFURantoin macrocrystal-monohydrate (MACROBID) 100 MG capsule  Dispense: 14 capsule; Refill: 0    BV (bacterial vaginosis)    You have a vaginal infection called bacterial vaginosis (BV). BV occurs when the good bacteria are outnumbered by the bad bacteria causes an imbalance in the vagina. BV is linked with sexual activity, but it's not a sexually transmitted infection (STI).   BV may or may not cause symptoms of thin, gray, milky-white, or sometimes green discharge, unpleasant odor or  fishy  smell.  BV is most often treated with medicines called antibiotics. These may be given as pills or as a vaginal cream.  Do not drink alcohol while on Flagyl or Metrogel as this can cause severe nausea and vomiting.    It's not known what causes BV, but certain  factors can make the problem more likely. These can include:   Douching  Spermicides  Use of antibiotics  Change in hormone levels with pregnancy, breastfeeding, or menopause  Having sex with a new partner  Having sex with more than one partner      - metroNIDAZOLE (METROGEL) 0.75 % vaginal gel  Dispense: 25 g; Refill: 0         At today's visit with Milla Tinoco , we discussed results, diagnosis, medications and formulated a plan.  We also discussed red flags for immediate return to clinic/ER, as well as indications for follow up with PCP if not improved in 3 days. Patient understood and agreed to plan. Milla Tinoco was discharged with stable vitals and has no further questions.       No follow-ups on file.    Chan Montenegro, [facility], PA-C  [facility] [place] is a 29 year old female who presents to clinic today for the following health issues:  Chief Complaint   Patient presents with    UTI     Burning sensation while urinating, and pain while taking a bowel for 1 week.       HPI  Review of Systems  Constitutional, HEENT, cardiovascular, pulmonary, GI, , musculoskeletal, neuro, skin, endocrine and psych systems are negative, except as otherwise noted.      Objective    BP (!) 141/79   Pulse 87   Temp 97.8  F (36.6  C) (Tympanic)   Wt (!) 156.9 kg (345 lb 12.8 oz)   SpO2 98%   BMI 65.34 kg/m    Physical Exam   GENERAL: alert and no distress  ABDOMEN: soft, nontender, no hepatosplenomegaly, no masses and bowel sounds normal   (female):  deferred  MS: no gross musculoskeletal defects noted, no edema  SKIN: no suspicious lesions or rashes  NEURO: Normal strength and tone, mentation intact and speech normal      Results for orders placed or performed in visit on 06/28/24   UA Macroscopic with reflex to Microscopic and Culture - Clinic Collect     Status: Abnormal    Specimen: Urine, Clean Catch   Result Value Ref Range    Color Urine Yellow Colorless, Straw, Light Yellow,  Yellow    Appearance Urine Clear Clear    Glucose Urine Negative Negative mg/dL    Bilirubin Urine Negative Negative    Ketones Urine Negative Negative mg/dL    Specific Gravity Urine >=1.030 1.003 - 1.035    Blood Urine Small (A) Negative    pH Urine 6.0 5.0 - 7.0    Protein Albumin Urine Negative Negative mg/dL    Urobilinogen Urine 0.2 0.2, 1.0 E.U./dL    Nitrite Urine Negative Negative    Leukocyte Esterase Urine Small (A) Negative   UA Microscopic with Reflex to Culture     Status: Abnormal   Result Value Ref Range    Bacteria Urine Moderate (A) None Seen /HPF    RBC Urine 5-10 (A) 0-2 /HPF /HPF    WBC Urine 10-25 (A) 0-5 /HPF /HPF    Squamous Epithelials Urine Moderate (A) None Seen /LPF   Wet prep - Clinic Collect     Status: Abnormal    Specimen: Vagina; Swab   Result Value Ref Range    Trichomonas Absent Absent    Yeast Absent Absent    Clue Cells Present (A) Absent    WBCs/high power field 4+ (A) None

## 2024-06-29 LAB — BACTERIA UR CULT: NORMAL

## 2024-07-08 ENCOUNTER — OFFICE VISIT (OUTPATIENT)
Dept: URGENT CARE | Facility: URGENT CARE | Age: 29
End: 2024-07-08
Payer: COMMERCIAL

## 2024-07-08 VITALS
DIASTOLIC BLOOD PRESSURE: 79 MMHG | SYSTOLIC BLOOD PRESSURE: 124 MMHG | OXYGEN SATURATION: 99 % | BODY MASS INDEX: 65.83 KG/M2 | WEIGHT: 293 LBS | HEART RATE: 100 BPM | RESPIRATION RATE: 24 BRPM | TEMPERATURE: 98.4 F

## 2024-07-08 DIAGNOSIS — M79.672 LEFT FOOT PAIN: Primary | ICD-10-CM

## 2024-07-08 PROCEDURE — 99213 OFFICE O/P EST LOW 20 MIN: CPT | Performed by: FAMILY MEDICINE

## 2024-07-08 RX ORDER — NAPROXEN 500 MG/1
500 TABLET ORAL 2 TIMES DAILY WITH MEALS
Qty: 14 TABLET | Refills: 0 | Status: SHIPPED | OUTPATIENT
Start: 2024-07-08 | End: 2024-07-15

## 2024-07-09 NOTE — PROGRESS NOTES
SUBJECTIVE:  Chief Complaint   Patient presents with    Musculoskeletal Problem     Left foot pain inner side near ankle .  Fell on it this winter with pain on and off.  Past week pain is worse and steady.     .ident who presents with a chief complaint of  left foot pain.  Symptoms began 1 week(s) ago   Context:Injury: no    No past medical history on file.  Allergies   Allergen Reactions    Mushroom      .socx    ROS:Review of systems negative except as stated below    EXAM: /79 (BP Location: Left arm, Patient Position: Sitting, Cuff Size: Adult Large)   Pulse 100   Temp 98.4  F (36.9  C) (Tympanic)   Resp 24   Wt (!) 158 kg (348 lb 6.4 oz)   SpO2 99%   BMI 65.83 kg/m    Exam:arch of left foot pain  GENERAL APPEARANCE: healthy, alert and no distress  EXTREMITIES: peripheral pulses normal  SKIN: no suspicious lesions or rashes  NEURO: Normal strength and tone, sensory exam grossly normal, mentation intact and speech normal    X-RAY was not done.    ASSESSMENT:     ICD-10-CM    1. Left foot pain  M79.672 naproxen (NAPROSYN) 500 MG tablet     Orthopedic  Referral        ice

## 2024-10-21 ENCOUNTER — NURSE TRIAGE (OUTPATIENT)
Dept: FAMILY MEDICINE | Facility: CLINIC | Age: 29
End: 2024-10-21
Payer: COMMERCIAL

## 2024-10-21 ENCOUNTER — OFFICE VISIT (OUTPATIENT)
Dept: URGENT CARE | Facility: URGENT CARE | Age: 29
End: 2024-10-21
Payer: COMMERCIAL

## 2024-10-21 VITALS
HEART RATE: 92 BPM | OXYGEN SATURATION: 97 % | TEMPERATURE: 98.1 F | BODY MASS INDEX: 64.28 KG/M2 | WEIGHT: 293 LBS | RESPIRATION RATE: 16 BRPM | SYSTOLIC BLOOD PRESSURE: 110 MMHG | DIASTOLIC BLOOD PRESSURE: 74 MMHG

## 2024-10-21 DIAGNOSIS — N93.8 DUB (DYSFUNCTIONAL UTERINE BLEEDING): ICD-10-CM

## 2024-10-21 DIAGNOSIS — N93.9 VAGINAL BLEEDING: Primary | ICD-10-CM

## 2024-10-21 LAB
ALBUMIN UR-MCNC: ABNORMAL MG/DL
APPEARANCE UR: CLEAR
BACTERIA #/AREA URNS HPF: ABNORMAL /HPF
BILIRUB UR QL STRIP: NEGATIVE
CLUE CELLS: PRESENT
COLOR UR AUTO: YELLOW
ERYTHROCYTE [DISTWIDTH] IN BLOOD BY AUTOMATED COUNT: 16 % (ref 10–15)
FERRITIN SERPL-MCNC: 23 NG/ML (ref 6–175)
GLUCOSE UR STRIP-MCNC: NEGATIVE MG/DL
HCG UR QL: NEGATIVE
HCT VFR BLD AUTO: 37.8 % (ref 35–47)
HGB BLD-MCNC: 11.9 G/DL (ref 11.7–15.7)
HGB UR QL STRIP: ABNORMAL
HOLD SPECIMEN: NORMAL
IRON BINDING CAPACITY (ROCHE): 327 UG/DL (ref 240–430)
IRON SATN MFR SERPL: 8 % (ref 15–46)
IRON SERPL-MCNC: 27 UG/DL (ref 37–145)
KETONES UR STRIP-MCNC: ABNORMAL MG/DL
LEUKOCYTE ESTERASE UR QL STRIP: NEGATIVE
MCH RBC QN AUTO: 22.5 PG (ref 26.5–33)
MCHC RBC AUTO-ENTMCNC: 31.5 G/DL (ref 31.5–36.5)
MCV RBC AUTO: 72 FL (ref 78–100)
NITRATE UR QL: NEGATIVE
PH UR STRIP: 7 [PH] (ref 5–7)
PLATELET # BLD AUTO: 387 10E3/UL (ref 150–450)
RBC # BLD AUTO: 5.28 10E6/UL (ref 3.8–5.2)
RBC #/AREA URNS AUTO: >100 /HPF
SP GR UR STRIP: 1.02 (ref 1–1.03)
SQUAMOUS #/AREA URNS AUTO: ABNORMAL /LPF
TRICHOMONAS, WET PREP: ABNORMAL
UROBILINOGEN UR STRIP-ACNC: 1 E.U./DL
WBC # BLD AUTO: 8.2 10E3/UL (ref 4–11)
WBC #/AREA URNS AUTO: ABNORMAL /HPF
WBC'S/HIGH POWER FIELD, WET PREP: ABNORMAL
YEAST, WET PREP: ABNORMAL

## 2024-10-21 PROCEDURE — 85027 COMPLETE CBC AUTOMATED: CPT | Performed by: FAMILY MEDICINE

## 2024-10-21 PROCEDURE — 36415 COLL VENOUS BLD VENIPUNCTURE: CPT | Performed by: FAMILY MEDICINE

## 2024-10-21 PROCEDURE — 83540 ASSAY OF IRON: CPT | Performed by: FAMILY MEDICINE

## 2024-10-21 PROCEDURE — 99214 OFFICE O/P EST MOD 30 MIN: CPT | Performed by: FAMILY MEDICINE

## 2024-10-21 PROCEDURE — 87210 SMEAR WET MOUNT SALINE/INK: CPT | Performed by: FAMILY MEDICINE

## 2024-10-21 PROCEDURE — 82728 ASSAY OF FERRITIN: CPT | Performed by: FAMILY MEDICINE

## 2024-10-21 PROCEDURE — 81001 URINALYSIS AUTO W/SCOPE: CPT | Performed by: FAMILY MEDICINE

## 2024-10-21 PROCEDURE — 83550 IRON BINDING TEST: CPT | Performed by: FAMILY MEDICINE

## 2024-10-21 PROCEDURE — 81025 URINE PREGNANCY TEST: CPT | Performed by: FAMILY MEDICINE

## 2024-10-21 NOTE — TELEPHONE ENCOUNTER
"Call received from patient reporting heavy vaginal bleeding for almost 2 weeks. Patient's periods are irregular and she often goes 3-4 month between periods. States usually her periods come at the end of the month so the timing was also \"weird\". Patient is using a pad and a tampon. Normally her periods last 2 days and are not this heavy. Patient has been changing a super sized tampon 3 times daily. She needs to change the pad 5-6 times per day because it is nearly saturated. Denies clots. Patient reports cramping when she removes her tampon but denies cramping otherwise. Reports weakness and light headedness/dizziness x 2 in the past few days. The first time she was sitting when it happened and the second time (today) she was walking. Patient went to an ER yesterday but the wait was 9 hours so she didn't wait. Advised appointment. Patient had been offered an appointment tomorrow before she was transferred to triage but the location was too far away. Advised we could have scheduling look for an appointment this week but if patient would like to be seen sooner she should go to an urgent care or ER. Patient agrees and will go to urgent care.     Reason for Disposition   Periods last > 7 days    Additional Information   Negative: SEVERE vaginal bleeding (e.g., continuous red blood from vagina, or large blood clots) and very weak (can't stand)   Negative: Passed out (i.e., fainted, collapsed and was not responding)   Negative: Difficult to awaken or acting confused (e.g., disoriented, slurred speech)   Negative: Shock suspected (e.g., cold/pale/clammy skin, too weak to stand, low BP, rapid pulse)   Negative: Sounds like a life-threatening emergency to the triager   Negative: Pregnant 20 or more weeks (5 months or more)   Negative: Pregnant < 20 weeks (less than 5 months)   Negative: Postpartum (from 0 to 6 weeks after delivery)   Negative: Vaginal discharge is main symptom and bleeding is slight   Negative: SEVERE " abdominal pain (e.g., excruciating)   Negative: SEVERE dizziness (e.g., unable to stand, requires support to walk, feels like passing out now)   Negative: SEVERE vaginal bleeding (e.g., soaking 2 pads or tampons per hour and present 2 or more hours; 1 menstrual cup every 2 hours)   Negative: Patient sounds very sick or weak to the triager   Negative: MODERATE vaginal bleeding (i.e., soaking pad or tampon per hour and present > 6 hours; 1 menstrual cup every 6 hours)   Negative: Constant abdominal pain lasting > 2 hours   Negative: Pale skin (pallor) of new-onset or worsening   Negative: Taking Coumadin (warfarin) or other strong blood thinner, or known bleeding disorder (e.g., thrombocytopenia)   Negative: Skin bruises or nosebleed and not caused by an injury   Negative: Patient wants to be seen   Negative: Passed tissue (e.g., gray-white)   Negative: Bleeding or spotting after procedure (e.g., biopsy) or pelvic examination (e.g., pap smear) that lasts > 7 days   Negative: Periods with > 6 soaked pads or tampons per day   Negative: Uses menstrual cups and more than 80 ml blood per menstrual period   Negative: Missed period has occurred 2 or more times in the last year and the cause is not known   Negative: Menstrual cycle < 21 days OR > 35 days, and occurs more than two cycles (2 months) this past year   Negative: Bleeding or spotting between regular periods occurs more than three cycles (3 months) this past year   Negative: Bleeding or spotting between regular periods occurs more than three cycles (3 months), and using birth control medicine (e.g., pills, patch, Depo-Provera, Implanon, vaginal ring, Mirena IUD)   Negative: Bleeding or spotting occurs after hysterectomy   Negative: Age > 39 years with irregular or excessive bleeding   Negative: Bleeding or spotting occurs after sex  (Exception: First intercourse.)    Protocols used: Vaginal Bleeding - [id number]

## 2024-10-21 NOTE — PROGRESS NOTES
SUBJECTIVE: Milla Tinoco is a 29 year old female presenting with a chief complaint of heavy vag bleeding for 12 days, no abd pain.    No past medical history on file.  Allergies   Allergen Reactions    Mushroom      Social History     Tobacco Use    Smoking status: Never    Smokeless tobacco: Never   Substance Use Topics    Alcohol use: No       ROS:  SKIN: no rash  GI: no vomiting    OBJECTIVE:  /74 (BP Location: Left arm, Patient Position: Sitting, Cuff Size: Adult Large)   Pulse 92   Temp 98.1  F (36.7  C) (Oral)   Resp 16   Wt (!) 154.3 kg (340 lb 3.2 oz)   SpO2 97%   BMI 64.28 kg/m  GENERAL APPEARANCE: healthy, alert and no distress  ABDOMEN:  soft, nontender  SKIN: no suspicious lesions or rashes      ICD-10-CM    1. Vaginal bleeding  N93.9 Wet prep - Clinic Collect     UA with Microscopic reflex to Culture - Clinic Collect     HCG Qual, Urine ([id number])     UA Microscopic with Reflex to Culture     CBC with Platelets and Reflex to Iron Studies     Iron & Iron Binding Capacity     Ferritin     norethindrone-ethinyl estradiol (ORTHO-NOVUM) 1-35 MG-MCG tablet      2. DUB (dysfunctional uterine bleeding)  N93.8 norethindrone-ethinyl estradiol (ORTHO-NOVUM) 1-35 MG-MCG tablet        F/up PCP/GYN  ED if worse

## 2024-12-01 ENCOUNTER — HEALTH MAINTENANCE LETTER (OUTPATIENT)
Age: 29
End: 2024-12-01

## 2025-07-08 ENCOUNTER — OFFICE VISIT (OUTPATIENT)
Dept: URGENT CARE | Facility: URGENT CARE | Age: 30
End: 2025-07-08
Payer: COMMERCIAL

## 2025-07-08 VITALS
WEIGHT: 293 LBS | DIASTOLIC BLOOD PRESSURE: 63 MMHG | OXYGEN SATURATION: 96 % | HEIGHT: 61 IN | BODY MASS INDEX: 55.32 KG/M2 | HEART RATE: 86 BPM | TEMPERATURE: 97.8 F | RESPIRATION RATE: 20 BRPM | SYSTOLIC BLOOD PRESSURE: 110 MMHG

## 2025-07-08 DIAGNOSIS — L24.3 IRRITANT CONTACT DERMATITIS DUE TO COSMETICS: Primary | ICD-10-CM

## 2025-07-08 PROCEDURE — 99213 OFFICE O/P EST LOW 20 MIN: CPT | Performed by: NURSE PRACTITIONER

## 2025-07-08 NOTE — PROGRESS NOTES
Urgent Care Clinic Visit     Chief Complaint   Patient presents with    Urgent Care     Puffy eyes, facial swelling - after getting a facial on Thursday  7/3- and noticed swelling on Saturday 7/5 7/8/2025    10:17 AM   Additional Questions   Roomed by Amanda         7/8/2025    10:17 AM   Patient Reported Additional Medications   Patient reports taking the following new medications bendaryl

## 2025-07-08 NOTE — PROGRESS NOTES
"Chief Complaint   Patient presents with    Urgent Care     Puffy eyes, facial swelling - after getting a facial on Thursday  7/3- and noticed swelling on Saturday 7/5          ICD-10-CM    1. Irritant contact dermatitis due to cosmetics  L24.3       Gentle exfoliating gel for dry skin with washcloth.  Good moisturizing scent free lotion or cream.    Subjective     Milla Tinoco is an 30 year old female who presents to clinic today for facial redness and peeling that started 24 hours after she had a facial.  She reports the swelling she had her face has diminished.  She denies any abnormal drainage from the face, she has not had a fever or chills.    Objective    /63   Pulse 86   Temp 97.8  F (36.6  C) (Tympanic)   Resp 20   Ht 1.549 m (5' 1\")   Wt (!) 153.3 kg (338 lb)   SpO2 96%   BMI 63.86 kg/m    Nurses notes and VS have been reviewed.    Physical Exam       GENERAL APPEARANCE: healthy appearing, alert     EYES: PERRL, EOMI, sclera non-icteric     HENT: oral exam benign, mucus membranes intact, without ulcers or lesions     NECK: no adenopathy or asymmetry, thyroid normal to palpation     SKIN: Scaling peeling skin noted all over face, sparing the immediate area around her eyes, no significant erythema is noted      YOEL Hurtado, CNP  [facility] [place] [facility] Provider    The use of Dragon/Vidyo dictation services may have been used to construct the content in this note; any grammatical or spelling errors are non-intentional. Please contact the author of this note directly if you are in need of any clarification.     "